# Patient Record
Sex: FEMALE | Race: WHITE | NOT HISPANIC OR LATINO | Employment: FULL TIME | URBAN - NONMETROPOLITAN AREA
[De-identification: names, ages, dates, MRNs, and addresses within clinical notes are randomized per-mention and may not be internally consistent; named-entity substitution may affect disease eponyms.]

---

## 2017-01-05 ENCOUNTER — DOCUMENTATION (OUTPATIENT)
Dept: CARDIOLOGY | Facility: CLINIC | Age: 57
End: 2017-01-05

## 2017-01-05 NOTE — PROGRESS NOTES
PA for Edarbi was faxed on 1/5/17. Received word today that this medication is covered on patients plan.

## 2017-02-06 ENCOUNTER — TELEPHONE (OUTPATIENT)
Dept: CARDIOLOGY | Facility: CLINIC | Age: 57
End: 2017-02-06

## 2017-02-06 NOTE — TELEPHONE ENCOUNTER
Received call from patient reports is unable to tolerate crestor, states has been in bed for 4 days muscle pain states wasn't able to tolerate zocor in the past.  What are your recommendations?

## 2017-02-21 ENCOUNTER — TELEPHONE (OUTPATIENT)
Dept: CARDIOLOGY | Facility: CLINIC | Age: 57
End: 2017-02-21

## 2017-02-21 RX ORDER — NIFEDIPINE 30 MG/1
TABLET, EXTENDED RELEASE ORAL
Qty: 90 TABLET | Refills: 0 | OUTPATIENT
Start: 2017-02-21

## 2017-02-21 RX ORDER — NIFEDIPINE 30 MG/1
30 TABLET, EXTENDED RELEASE ORAL DAILY
Qty: 90 TABLET | Refills: 1 | Status: SHIPPED | OUTPATIENT
Start: 2017-02-21 | End: 2017-03-27 | Stop reason: ALTCHOICE

## 2017-02-21 NOTE — TELEPHONE ENCOUNTER
Patient called requesting refill on Procardia 30mg daily, she reports she was unable to take Procardia 60mg daily due to swelling in legs, she reports that she notified you at [place]. Is it ok to refill Procardia 30mg daily? Thanks

## 2017-03-24 ENCOUNTER — TELEPHONE (OUTPATIENT)
Dept: CARDIOLOGY | Facility: CLINIC | Age: 57
End: 2017-03-24

## 2017-03-24 NOTE — TELEPHONE ENCOUNTER
Patient called reports after taking  procardia notices that lower legs/lower calf are swelling and turning red, wants to know if there is something else that can be used besides procardia? Thank you.           Medications:  Cozaar 100mg qam  bystolic 2.5mg qam  Procardia 30mg qam  Synthroid 125mcg qam  celebrex 200mg daily  Methotrexate 2.5mg 6 tablet once week

## 2017-03-27 NOTE — TELEPHONE ENCOUNTER
Patient notified to D/C Procardia and Cozaar and start Edarbi 40mg daily, patient already has script at [facility] pharmacy.

## 2017-04-13 ENCOUNTER — TELEPHONE (OUTPATIENT)
Dept: CARDIOLOGY | Facility: CLINIC | Age: 57
End: 2017-04-13

## 2017-04-13 RX ORDER — AMLODIPINE BESYLATE 5 MG/1
5 TABLET ORAL DAILY
Qty: 90 TABLET | Refills: 3 | Status: SHIPPED | OUTPATIENT
Start: 2017-04-13 | End: 2017-12-07 | Stop reason: SDUPTHER

## 2017-04-13 NOTE — TELEPHONE ENCOUNTER
Received call from patient who reports was changed from procardia and cozaar (due to legs swelling and pain) to edarbi 40mg daily reports swelling in legs better and doing well on edarbi but b/p not staying down in evening b/p yesterday evening 164/108, day before that 163/91, wants to know if can increase edarbi or if something else can be added in evening? What are your recommendations? Thank you    Medications:  bystolic 2.5mg qam  edarbi 40mg qam  Synthroid 125mcg qam  celebrex 200mg daily  Methotrexate 2.5mg 6 tablets once a week

## 2017-07-31 ENCOUNTER — OFFICE VISIT (OUTPATIENT)
Dept: CARDIOLOGY | Facility: CLINIC | Age: 57
End: 2017-07-31

## 2017-07-31 ENCOUNTER — TELEPHONE (OUTPATIENT)
Dept: CARDIOLOGY | Facility: CLINIC | Age: 57
End: 2017-07-31

## 2017-07-31 VITALS
DIASTOLIC BLOOD PRESSURE: 72 MMHG | HEIGHT: 68 IN | BODY MASS INDEX: 31.37 KG/M2 | WEIGHT: 207 LBS | SYSTOLIC BLOOD PRESSURE: 100 MMHG | HEART RATE: 68 BPM

## 2017-07-31 DIAGNOSIS — R06.02 SHORTNESS OF BREATH: ICD-10-CM

## 2017-07-31 DIAGNOSIS — R00.2 PALPITATIONS: ICD-10-CM

## 2017-07-31 DIAGNOSIS — E03.9 ACQUIRED HYPOTHYROIDISM: ICD-10-CM

## 2017-07-31 DIAGNOSIS — I10 ESSENTIAL HYPERTENSION: Primary | ICD-10-CM

## 2017-07-31 DIAGNOSIS — D86.0 SARCOIDOSIS OF LUNG (HCC): ICD-10-CM

## 2017-07-31 DIAGNOSIS — E78.00 HYPERCHOLESTEREMIA: ICD-10-CM

## 2017-07-31 PROCEDURE — 99214 OFFICE O/P EST MOD 30 MIN: CPT | Performed by: INTERNAL MEDICINE

## 2017-07-31 RX ORDER — CELECOXIB 200 MG/1
200 CAPSULE ORAL DAILY
COMMUNITY

## 2017-07-31 RX ORDER — GABAPENTIN 100 MG/1
100 CAPSULE ORAL 3 TIMES DAILY PRN
COMMUNITY
End: 2019-04-08

## 2017-07-31 RX ORDER — LOSARTAN POTASSIUM 100 MG/1
100 TABLET ORAL DAILY
COMMUNITY
End: 2018-02-01 | Stop reason: SDUPTHER

## 2017-07-31 NOTE — TELEPHONE ENCOUNTER
If you have not already planned on this, could you please put in an order for a Lipid panel. Her last labs were done in August of 2016. This is for the Repatha PA. Thank you    Also in filling out form for Brittany I noticed she has an allergy to Latex. Brittany has a latex tip. Do you want to change to Praluent ?

## 2017-07-31 NOTE — PROGRESS NOTES
"Chief Complaint   Patient presents with   • Follow-up     Denies chest pain. Stopped taking the edarbi and is taking the losartan. Also, is asking about either trying repatha or praluent. Has tried crestor and simvastatin and was unable to tolerate either. Didn't bring med list but went over verbally. Brought copy of recent labs. \"I have completely quit salt\".    • Shortness of Breath     With exertion. The same as before.    • Palpitations     Occasional. The same as before.        CARDIAC COMPLAINTS  dyspnea and palpitations        Subjective   Yessenia Baxter is a 57 y.o. female came in today for her follow-up visit.  She has history of hypertension, hypothyroidism, hypercholesterolemia who also has connective tissue problem in the form of lupus, rheumatoid arthritis and sarcoidosis.  She underwent an echocardiogram and stress test support 7 months ago.  She had significant hypertensive blood pressure response, normal LV systolic function, no ischemia, moderate AI take insufficiency and mild-to-moderate pulmonary hypertension.  Changes were made with the medications, but she changed it back secondary to the side effects.  She still not able to tolerate any statins.  Her cholesterol is still elevated with the LDL of 150.  Her major symptom now weighs shortness of breath.              Cardiac History  Past Surgical History:   Procedure Laterality Date   • CARDIOVASCULAR STRESS TEST  09/16/2015    R. [facility]- [facility] 6 Min, 90% THR. BP- 204/100. Few PVC's. Negative    • CARDIOVASCULAR STRESS TEST  11/28/2016    @[facility]. 6 Min, 89%THR. BP- 210/106. Negative   • ECHO - CONVERTED  09/16/2015     Echo- ([facility]) EF 60%. Mod AI. RVSP- 50 mmHg    • ECHO - CONVERTED  11/28/2016    @[facility]. EF 65%. Mild- Mod AI. RVSP-51 mmHg       Current Outpatient Prescriptions   Medication Sig Dispense Refill   • acetaminophen (TYLENOL) 500 MG tablet Take 500 mg by mouth As Needed for mild pain (1-3).     • amLODIPine (NORVASC) 5 MG tablet Take 1 tablet " by mouth Daily. 90 tablet 3   • celecoxib (CeleBREX) 200 MG capsule Take 200 mg by mouth Daily.     • fexofenadine (ALLEGRA) 180 MG tablet Take 180 mg by mouth As Needed.     • folic acid (FOLVITE) 1 MG tablet Take 1 mg by mouth Daily.     • gabapentin (NEURONTIN) 100 MG capsule Take 100 mg by mouth 3 (Three) Times a Day As Needed.     • levothyroxine (SYNTHROID, LEVOTHROID) 125 MCG tablet Take 125 mcg by mouth Daily.     • losartan (COZAAR) 100 MG tablet Take 100 mg by mouth Daily.     • methotrexate 2.5 MG tablet Take  by mouth. Take 6 tabs weekly     • Multiple Vitamin (MULTI VITAMIN PO) Take  by mouth Daily.     • nebivolol (BYSTOLIC) 2.5 MG tablet Take 1 tablet by mouth Daily. 90 tablet 3   • vitamin D (ERGOCALCIFEROL) 94566 UNITS capsule capsule Take 50,000 Units by mouth 1 (One) Time Per Week.     • Evolocumab with Infusor 420 MG/3.5ML solution cartridge Inject 420 mg under the skin Every 30 (Thirty) Days. 1 cartridge. 8     No current facility-administered medications for this visit.        Allergies  :  Crestor [rosuvastatin calcium]; Demerol [meperidine]; Latex; Other; Penicillins; Simvastatin; and Tetracyclines & related       Past Medical History:   Diagnosis Date   • Arthritis    • Cyst removed from right hand    • Esophageal reflux    • Fibromyalgia    • H/O shoulder surgery     right   • H/O: hysterectomy    • History of cholecystectomy    • Hypercholesterolemia    • Hypertension    • Hypothyroidism    • Left knee surgery twice    • Sarcoidosis        Social History     Social History   • Marital status:      Spouse name: N/A   • Number of children: N/A   • Years of education: N/A     Occupational History   • Not on file.     Social History Main Topics   • Smoking status: Never Smoker   • Smokeless tobacco: Never Used   • Alcohol use No   • Drug use: No   • Sexual activity: Not on file     Other Topics Concern   • Not on file     Social History Narrative       Family History   Problem Relation  "Age of Onset   • Stroke Mother 76   • Other Mother      History of A Fib.   • Heart attack Father    • Heart failure Father    • Other Other       one at age 43 had AVR. one had stents at 67,        Review of Systems   Constitution: Negative for decreased appetite.   HENT: Negative for congestion and sore throat.    Eyes: Negative for blurred vision.   Cardiovascular: Positive for dyspnea on exertion. Negative for chest pain.   Respiratory: Positive for shortness of breath. Negative for snoring.    Endocrine: Negative for cold intolerance and heat intolerance.   Hematologic/Lymphatic: Negative for adenopathy. Does not bruise/bleed easily.   Skin: Negative for itching, nail changes and skin cancer.   Musculoskeletal: Positive for arthritis and myalgias.   Gastrointestinal: Negative for abdominal pain, dysphagia and heartburn.   Genitourinary: Negative for bladder incontinence and frequency.   Neurological: Negative for dizziness, light-headedness, seizures and vertigo.   Psychiatric/Behavioral: Negative for altered mental status.   Allergic/Immunologic: Negative for environmental allergies and hives.       Diabetes- No  Thyroid- abnormal    Objective     /72  Pulse 68  Ht 68\" (172.7 cm)  Wt 207 lb (93.9 kg)  BMI 31.47 kg/m2    Physical Exam   Constitutional: She is oriented to person, place, and time. She appears well-nourished.   HENT:   Head: Normocephalic.   Eyes: Pupils are equal, round, and reactive to light.   Neck: Normal range of motion.   Cardiovascular: Normal rate, regular rhythm, S1 normal and S2 normal.    Murmur heard.  Pulmonary/Chest: Breath sounds normal.   Abdominal: Soft. Bowel sounds are normal.   Musculoskeletal: Normal range of motion.   Neurological: She is alert and oriented to person, place, and time.   Skin: Skin is warm.   Psychiatric: She has a normal mood and affect.     Procedures            Assessment/Plan     Yessenia was seen today for follow-up, shortness of breath and " palpitations.    Diagnoses and all orders for this visit:    Essential hypertension    Hypercholesteremia  -     Evolocumab with Infusor 420 MG/3.5ML solution cartridge; Inject 420 mg under the skin Every 30 (Thirty) Days.    Shortness of breath  -     Ambulatory Referral to Pulmonology    Palpitations    Acquired hypothyroidism    Sarcoidosis of lung  -     Ambulatory Referral to Pulmonology       Her heart rate and blood pressure at baseline appears stable.  I had a long talk with her about the echo and the stress findings.  She had numerous questions regarding the pulmonary hypertension.  I explained to her about her connective tissue problem and the possible sarcoidosis.  She needs an x-ray chest as well as PFT's.  She also need to see a pulmonologist.  She wanted to see someone locally and consults were made.  Regarding her cholesterol, since she is not able to tolerate any statins, I started her on Repatha.  I explained to her the possible side effects.  I'll see her back in 6 months or sooner if needed.                   Electronically signed by Ja Bojorquez MD July 31, 2017 4:35 PM

## 2017-10-30 ENCOUNTER — DOCUMENTATION (OUTPATIENT)
Dept: CARDIOLOGY | Facility: CLINIC | Age: 57
End: 2017-10-30

## 2017-10-30 ENCOUNTER — TELEPHONE (OUTPATIENT)
Dept: CARDIOLOGY | Facility: CLINIC | Age: 57
End: 2017-10-30

## 2017-10-30 NOTE — TELEPHONE ENCOUNTER
In order to get Praluent approved for this patient , she is needing a diagnosis of Familial Hypercholesterolemia.

## 2017-10-31 ENCOUNTER — TELEPHONE (OUTPATIENT)
Dept: CARDIOLOGY | Facility: CLINIC | Age: 57
End: 2017-10-31

## 2017-10-31 NOTE — TELEPHONE ENCOUNTER
Spoke to patient.  Both parents and 3 siblings have hypercholesterolemia. Form completed and faxed on 10/31/17

## 2017-10-31 NOTE — TELEPHONE ENCOUNTER
I am waiting for an answer from patients insurance regarding Paluent.     Is it ok to give her samples to get her started? We have plenty samples on hand.

## 2017-11-02 ENCOUNTER — TELEPHONE (OUTPATIENT)
Dept: CARDIOLOGY | Facility: CLINIC | Age: 57
End: 2017-11-02

## 2017-11-02 DIAGNOSIS — I35.1 AORTIC VALVE INSUFFICIENCY, ETIOLOGY OF CARDIAC VALVE DISEASE UNSPECIFIED: Primary | ICD-10-CM

## 2017-11-02 NOTE — TELEPHONE ENCOUNTER
LM message for patient informing her that Echo has been ordered and that Lizandro will be calling with scheduled date and time. Advised to call back with any questions

## 2017-11-02 NOTE — TELEPHONE ENCOUNTER
Patient was in office to  samples. She wanted me to ask you if she could get an echo done before the end of the year due to insurance? She stated that she gets one every year.     If ok,  could you please put in an order? Thank you

## 2017-11-09 ENCOUNTER — TELEPHONE (OUTPATIENT)
Dept: CARDIOLOGY | Facility: CLINIC | Age: 57
End: 2017-11-09

## 2017-11-09 DIAGNOSIS — E78.01 FAMILIAL HYPERCHOLESTEREMIA: Primary | ICD-10-CM

## 2017-11-09 NOTE — TELEPHONE ENCOUNTER
Could you please place an order for a Fasting Lipid Panel?  The last one on record is 2016. This is for Praluent Appeal Letter.    Thank you

## 2017-11-13 ENCOUNTER — DOCUMENTATION (OUTPATIENT)
Dept: CARDIOLOGY | Facility: CLINIC | Age: 57
End: 2017-11-13

## 2017-11-21 ENCOUNTER — TELEPHONE (OUTPATIENT)
Dept: CARDIOLOGY | Facility: CLINIC | Age: 57
End: 2017-11-21

## 2017-11-21 NOTE — TELEPHONE ENCOUNTER
Insurance is questioning why patient is being started on the higher dose of Praluent 150 and not 75mg. Requiring Clinical Rationale for the higher dose.

## 2017-12-01 ENCOUNTER — TELEPHONE (OUTPATIENT)
Dept: CARDIOLOGY | Facility: CLINIC | Age: 57
End: 2017-12-01

## 2017-12-01 NOTE — TELEPHONE ENCOUNTER
Patient aware of echo results.  She recently started CPAP and has already noticed improvement in how she is feeling.

## 2017-12-07 ENCOUNTER — TELEPHONE (OUTPATIENT)
Dept: CARDIOLOGY | Facility: CLINIC | Age: 57
End: 2017-12-07

## 2017-12-07 RX ORDER — AMLODIPINE BESYLATE 5 MG/1
5 TABLET ORAL DAILY
Qty: 90 TABLET | Refills: 1 | Status: SHIPPED | OUTPATIENT
Start: 2017-12-07 | End: 2018-02-05 | Stop reason: SDUPTHER

## 2017-12-07 RX ORDER — NEBIVOLOL 2.5 MG/1
2.5 TABLET ORAL DAILY
Qty: 90 TABLET | Refills: 1 | Status: SHIPPED | OUTPATIENT
Start: 2017-12-07 | End: 2018-02-05 | Stop reason: SDUPTHER

## 2017-12-07 NOTE — TELEPHONE ENCOUNTER
Patient called requesting refill on Bystolic 2.5mg daily and Amlodipine 5mg daily for 90 days. Refills on Bystolic 2.5mg daily and Amlodipine 5mg daily sent to pharmacy. Patient gave verbal permission for her sister Daphnie Costa to  Praluent tomorrow, due to patient is out of town.

## 2018-01-03 ENCOUNTER — TELEPHONE (OUTPATIENT)
Dept: CARDIOLOGY | Facility: CLINIC | Age: 58
End: 2018-01-03

## 2018-01-03 NOTE — TELEPHONE ENCOUNTER
Patient is needing samples of Praluent 150 mg. However, we only have the 75 mg pens. She is asking if ok to take the 75 mg twice a month. Her last labs done in November showed .

## 2018-02-01 RX ORDER — LOSARTAN POTASSIUM 100 MG/1
TABLET ORAL
Qty: 90 TABLET | Refills: 1 | Status: SHIPPED | OUTPATIENT
Start: 2018-02-01 | End: 2018-02-05 | Stop reason: SDUPTHER

## 2018-02-05 ENCOUNTER — OFFICE VISIT (OUTPATIENT)
Dept: CARDIOLOGY | Facility: CLINIC | Age: 58
End: 2018-02-05

## 2018-02-05 VITALS
BODY MASS INDEX: 32.58 KG/M2 | SYSTOLIC BLOOD PRESSURE: 136 MMHG | DIASTOLIC BLOOD PRESSURE: 86 MMHG | HEART RATE: 72 BPM | WEIGHT: 215 LBS | HEIGHT: 68 IN

## 2018-02-05 DIAGNOSIS — E78.00 HYPERCHOLESTEREMIA: ICD-10-CM

## 2018-02-05 DIAGNOSIS — G47.30 SLEEP APNEA IN ADULT: ICD-10-CM

## 2018-02-05 DIAGNOSIS — R06.02 SHORTNESS OF BREATH: ICD-10-CM

## 2018-02-05 DIAGNOSIS — R00.2 PALPITATIONS: ICD-10-CM

## 2018-02-05 DIAGNOSIS — I10 ESSENTIAL HYPERTENSION: Primary | ICD-10-CM

## 2018-02-05 DIAGNOSIS — E03.9 ACQUIRED HYPOTHYROIDISM: ICD-10-CM

## 2018-02-05 DIAGNOSIS — D86.0 SARCOIDOSIS OF LUNG (HCC): ICD-10-CM

## 2018-02-05 PROCEDURE — 99213 OFFICE O/P EST LOW 20 MIN: CPT | Performed by: INTERNAL MEDICINE

## 2018-02-05 RX ORDER — LOSARTAN POTASSIUM 100 MG/1
100 TABLET ORAL DAILY
Qty: 90 TABLET | Refills: 3 | Status: SHIPPED | OUTPATIENT
Start: 2018-02-05 | End: 2018-04-11 | Stop reason: SDUPTHER

## 2018-02-05 RX ORDER — AMLODIPINE BESYLATE 5 MG/1
5 TABLET ORAL DAILY
Qty: 90 TABLET | Refills: 3 | Status: SHIPPED | OUTPATIENT
Start: 2018-02-05 | End: 2018-04-11 | Stop reason: SDUPTHER

## 2018-02-05 RX ORDER — NEBIVOLOL 2.5 MG/1
2.5 TABLET ORAL DAILY
Qty: 90 TABLET | Refills: 1 | Status: SHIPPED | OUTPATIENT
Start: 2018-02-05 | End: 2018-08-06 | Stop reason: SDUPTHER

## 2018-02-05 RX ORDER — CETIRIZINE HYDROCHLORIDE 10 MG/1
10 TABLET ORAL DAILY
COMMUNITY
End: 2019-04-08

## 2018-02-05 NOTE — PROGRESS NOTES
Chief Complaint   Patient presents with   • Follow-up     for cardiac management   • Med Refill     refills needed on cardiac meds.  90 days to [facility] in [place].    • Palpitations     3-4 times a week, only lasts a few seconds.    • Labs     Most recent from November are in chart   • Shortness of Breath     still has occasionally but seems better since using CPAP at night for the past 2 months.        CARDIAC COMPLAINTS  dyspnea and palpitations        Subjective   Yessenia Baxter is a 58 y.o. female came in today for her follow-up visit.  She has history of hypertension, hypercholesterolemia, metabolic syndrome, sleep apnea who also has moderate aortic insufficiency.  Regarding her lipids, she is not able to tolerate any statins and is now using Praluent.  Her recent echocardiogram showed the left ventricle of function is normal mild to moderate AI and the PA pressure is 54 mmHg.  She is now using CPAP machine.  She came today with occasional palpitation which occurs randomly without any precipitating factors.  She does get shortness of breath if she climbs one or 2 flights of steps.  Her BMI as gone up to 33              Cardiac History  Past Surgical History:   Procedure Laterality Date   • CARDIOVASCULAR STRESS TEST  09/16/2015    R. Stress- ([facility]) 6 Min, 90% THR. BP- 204/100. Few PVC's. Negative    • CARDIOVASCULAR STRESS TEST  11/28/2016    @[facility]. 6 Min, 89%THR. BP- 210/106. Negative   • ECHO - CONVERTED  09/16/2015     Echo- ([facility]) EF 60%. Mod AI. RVSP- 50 mmHg    • ECHO - CONVERTED  11/28/2016    @[facility]. EF 65%. Mild- Mod AI. RVSP-51 mmHg   • ECHO - CONVERTED  11/21/2017    @[facility]. EF 65%. Mild- Mod AI. Mild MR. RVSP- 54 mmHg       Current Outpatient Prescriptions   Medication Sig Dispense Refill   • Acetaminophen (TYLENOL ARTHRITIS PAIN PO) Take  by mouth.     • acetaminophen (TYLENOL) 500 MG tablet Take 500 mg by mouth As Needed for mild pain (1-3).     • Alirocumab (PRALUENT) 75 MG/ML solution pen-injector  Inject 1 mL under the skin Every 14 (Fourteen) Days. 2 pen 0   • amLODIPine (NORVASC) 5 MG tablet Take 1 tablet by mouth Daily. 90 tablet 3   • BLACK COHOSH PO Take  by mouth.     • celecoxib (CeleBREX) 200 MG capsule Take 200 mg by mouth Daily.     • cetirizine (zyrTEC) 10 MG tablet Take 10 mg by mouth Daily.     • Cyanocobalamin (VITAMIN B-12 IJ) Inject  as directed Every 14 (Fourteen) Days.     • folic acid (FOLVITE) 1 MG tablet Take 1 mg by mouth Daily.     • gabapentin (NEURONTIN) 100 MG capsule Take 100 mg by mouth 3 (Three) Times a Day As Needed.     • levothyroxine (SYNTHROID, LEVOTHROID) 125 MCG tablet Take 125 mcg by mouth Daily.     • losartan (COZAAR) 100 MG tablet Take 1 tablet by mouth Daily. 90 tablet 3   • Multiple Vitamin (MULTI VITAMIN PO) Take  by mouth Daily.     • nebivolol (BYSTOLIC) 2.5 MG tablet Take 1 tablet by mouth Daily. 90 tablet 1   • Probiotic Product (SOLUBLE FIBER/PROBIOTICS PO) Take  by mouth.     • VITAMIN A PO Take  by mouth.     • vitamin D (ERGOCALCIFEROL) 78987 UNITS capsule capsule Take 50,000 Units by mouth 1 (One) Time Per Week.       No current facility-administered medications for this visit.        Allergies  :  Crestor [rosuvastatin calcium]; Demerol [meperidine]; Latex; Other; Penicillins; Simvastatin; and Tetracyclines & related       Past Medical History:   Diagnosis Date   • Arthritis    • Cyst removed from right hand    • Esophageal reflux    • Fibromyalgia    • H/O shoulder surgery     right   • H/O: hysterectomy    • History of cholecystectomy    • Hypercholesterolemia    • Hypertension    • Hypothyroidism    • Left knee surgery twice    • Sarcoidosis        Social History     Social History   • Marital status:      Spouse name: N/A   • Number of children: N/A   • Years of education: N/A     Occupational History   • Not on file.     Social History Main Topics   • Smoking status: Never Smoker   • Smokeless tobacco: Never Used   • Alcohol use No   • Drug use:  "No   • Sexual activity: Not on file     Other Topics Concern   • Not on file     Social History Narrative       Family History   Problem Relation Age of Onset   • Stroke Mother 76   • Other Mother      History of A Fib.   • Heart attack Father    • Heart failure Father    • Other Other       one at age 43 had AVR. one had stents at 67,        Review of Systems   Constitution: Positive for malaise/fatigue and weight gain. Negative for decreased appetite.   HENT: Negative for congestion and sore throat.    Eyes: Negative for blurred vision.   Cardiovascular: Positive for dyspnea on exertion and palpitations. Negative for chest pain.   Respiratory: Positive for shortness of breath and snoring.    Endocrine: Negative for cold intolerance and heat intolerance.   Hematologic/Lymphatic: Negative for adenopathy. Does not bruise/bleed easily.   Skin: Negative for itching, nail changes and skin cancer.   Musculoskeletal: Negative for arthritis and myalgias.   Gastrointestinal: Negative for abdominal pain, dysphagia and heartburn.   Genitourinary: Negative for bladder incontinence and frequency.   Neurological: Negative for dizziness, light-headedness, seizures and vertigo.   Psychiatric/Behavioral: Negative for altered mental status.   Allergic/Immunologic: Negative for environmental allergies and hives.       Diabetes- No  Thyroid- normal    Objective     /86  Pulse 72  Ht 172.7 cm (68\")  Wt 97.5 kg (215 lb)  BMI 32.69 kg/m2    Physical Exam   Constitutional: She is oriented to person, place, and time. She appears well-developed and well-nourished.   HENT:   Head: Normocephalic.   Nose: Nose normal.   Eyes: EOM are normal. Pupils are equal, round, and reactive to light.   Neck: Normal range of motion. Neck supple.   Cardiovascular: Normal rate, regular rhythm, S1 normal and S2 normal.    Murmur heard.  Pulmonary/Chest: Effort normal and breath sounds normal.   Abdominal: Soft. Bowel sounds are normal. "   Musculoskeletal: Normal range of motion. She exhibits no edema.   Neurological: She is alert and oriented to person, place, and time.   Skin: Skin is warm and dry.   Psychiatric: She has a normal mood and affect.     Procedures            Assessment/Plan     Yessenia was seen today for follow-up, med refill, palpitations, labs and shortness of breath.    Diagnoses and all orders for this visit:    Essential hypertension  -     nebivolol (BYSTOLIC) 2.5 MG tablet; Take 1 tablet by mouth Daily.  -     losartan (COZAAR) 100 MG tablet; Take 1 tablet by mouth Daily.  -     amLODIPine (NORVASC) 5 MG tablet; Take 1 tablet by mouth Daily.    Hypercholesteremia    Palpitations  -     nebivolol (BYSTOLIC) 2.5 MG tablet; Take 1 tablet by mouth Daily.    Shortness of breath    Sarcoidosis of lung    Acquired hypothyroidism    Sleep apnea in adult       Her heart rate and blood pressure is better.  I had a long talk with her about the BMI.  I talked to her about the low carb diet.  Most of her problem seems to be from that.  I explained to her about the echo findings.  Continue the same medications.  If the palpitation gets really worse, then we'll put an extended monitor.  Recheck her labs in about 4-5 months.  Reassurance about the cardiac status was given.  I'll see her back in 6 months or sooner if needed.                  Electronically signed by Ja Bojorquez MD February 5, 2018 3:47 PM

## 2018-04-11 ENCOUNTER — TELEPHONE (OUTPATIENT)
Dept: CARDIOLOGY | Facility: CLINIC | Age: 58
End: 2018-04-11

## 2018-04-11 DIAGNOSIS — I10 ESSENTIAL HYPERTENSION: ICD-10-CM

## 2018-04-11 RX ORDER — LOSARTAN POTASSIUM 100 MG/1
100 TABLET ORAL DAILY
Qty: 90 TABLET | Refills: 3 | Status: SHIPPED | OUTPATIENT
Start: 2018-04-11 | End: 2018-04-13 | Stop reason: SDUPTHER

## 2018-04-11 RX ORDER — AMLODIPINE BESYLATE 5 MG/1
5 TABLET ORAL DAILY
Qty: 90 TABLET | Refills: 3 | Status: SHIPPED | OUTPATIENT
Start: 2018-04-11 | End: 2018-08-06 | Stop reason: SDUPTHER

## 2018-04-13 DIAGNOSIS — I10 ESSENTIAL HYPERTENSION: ICD-10-CM

## 2018-04-13 RX ORDER — LOSARTAN POTASSIUM 100 MG/1
100 TABLET ORAL DAILY
Qty: 90 TABLET | Refills: 3 | Status: SHIPPED | OUTPATIENT
Start: 2018-04-13 | End: 2018-08-06 | Stop reason: SDUPTHER

## 2018-08-06 ENCOUNTER — OFFICE VISIT (OUTPATIENT)
Dept: CARDIOLOGY | Facility: CLINIC | Age: 58
End: 2018-08-06

## 2018-08-06 VITALS
BODY MASS INDEX: 31.67 KG/M2 | HEART RATE: 68 BPM | SYSTOLIC BLOOD PRESSURE: 136 MMHG | WEIGHT: 209 LBS | DIASTOLIC BLOOD PRESSURE: 90 MMHG | HEIGHT: 68 IN

## 2018-08-06 DIAGNOSIS — G47.30 SLEEP APNEA IN ADULT: ICD-10-CM

## 2018-08-06 DIAGNOSIS — R00.2 PALPITATIONS: ICD-10-CM

## 2018-08-06 DIAGNOSIS — R07.2 PRECORDIAL PAIN: ICD-10-CM

## 2018-08-06 DIAGNOSIS — R06.02 SHORTNESS OF BREATH: ICD-10-CM

## 2018-08-06 DIAGNOSIS — E03.9 ACQUIRED HYPOTHYROIDISM: ICD-10-CM

## 2018-08-06 DIAGNOSIS — I10 ESSENTIAL HYPERTENSION: Primary | ICD-10-CM

## 2018-08-06 DIAGNOSIS — E78.00 HYPERCHOLESTEREMIA: ICD-10-CM

## 2018-08-06 DIAGNOSIS — D86.0 SARCOIDOSIS OF LUNG (HCC): ICD-10-CM

## 2018-08-06 PROCEDURE — 99213 OFFICE O/P EST LOW 20 MIN: CPT | Performed by: INTERNAL MEDICINE

## 2018-08-06 RX ORDER — AMLODIPINE BESYLATE 5 MG/1
5 TABLET ORAL DAILY
Qty: 90 TABLET | Refills: 3 | Status: SHIPPED | OUTPATIENT
Start: 2018-08-06 | End: 2019-04-08 | Stop reason: SDUPTHER

## 2018-08-06 RX ORDER — NEBIVOLOL 2.5 MG/1
2.5 TABLET ORAL DAILY
Qty: 30 TABLET | Refills: 8 | Status: SHIPPED | OUTPATIENT
Start: 2018-08-06 | End: 2019-02-08 | Stop reason: DRUGHIGH

## 2018-08-06 RX ORDER — LOSARTAN POTASSIUM 100 MG/1
100 TABLET ORAL DAILY
Qty: 90 TABLET | Refills: 3 | Status: SHIPPED | OUTPATIENT
Start: 2018-08-06 | End: 2019-04-08 | Stop reason: SDUPTHER

## 2018-08-06 NOTE — PROGRESS NOTES
Chief Complaint   Patient presents with   • Follow-up     For cardiac management. Patient is not on aspirin. Was put on Xeljanz. Reports that she had one episode of chest pains and took 2 baby aspirin and it went away. Reports that she has cracked ribs due to fall so she has not used CPAP. Reports shortness of breath is no more than usual. Reports that lab work will be done this month at the [facility].    • Med Refill     Needs refills on cozaar and amlodipine to [facility], 90 day supplys. Bystolic to [facility] in [place], 30 day supplys.        CARDIAC COMPLAINTS  chest pressure/discomfort, dyspnea and fatigue        Subjective   Yessenia Baxter is a 58 y.o. female came in today for her follow-up visit.  She has history of hypertension, hypercholesterolemia, valvular heart disease who also has sarcoidosis.  She is not able to tolerate any statins and so she takes Praluent.  She is due to have her labs done in the next few weeks.  Apparently she fell and she injured her ribs.  She is not able to use the CPAP machine for a while now.  She had an episode of chest pain few months ago, which resolved with aspirin.  She is not taking any regular aspirin because of one episode of epistaxis she had in the past.              Cardiac History  Past Surgical History:   Procedure Laterality Date   • CARDIOVASCULAR STRESS TEST  09/16/2015    R. Stress- ([facility]) 6 Min, 90% THR. BP- 204/100. Few PVC's. Negative    • CARDIOVASCULAR STRESS TEST  11/28/2016    @[facility]. 6 Min, 89%THR. BP- 210/106. Negative   • ECHO - CONVERTED  09/16/2015     Echo- ([facility]) EF 60%. Mod AI. RVSP- 50 mmHg    • ECHO - CONVERTED  11/28/2016    @[facility]. EF 65%. Mild- Mod AI. RVSP-51 mmHg   • ECHO - CONVERTED  11/21/2017    @[facility]. EF 65%. Mild- Mod AI. Mild MR. RVSP- 54 mmHg       Current Outpatient Prescriptions   Medication Sig Dispense Refill   • Acetaminophen (TYLENOL ARTHRITIS PAIN PO) Take  by mouth.     • acetaminophen (TYLENOL) 500 MG tablet Take 500 mg by  mouth As Needed for mild pain (1-3).     • amLODIPine (NORVASC) 5 MG tablet Take 1 tablet by mouth Daily. 90 tablet 3   • BLACK COHOSH PO Take  by mouth.     • celecoxib (CeleBREX) 200 MG capsule Take 200 mg by mouth Daily.     • cetirizine (zyrTEC) 10 MG tablet Take 10 mg by mouth Daily.     • Cyanocobalamin (VITAMIN B-12 IJ) Inject  as directed Every 14 (Fourteen) Days.     • folic acid (FOLVITE) 1 MG tablet Take 1 mg by mouth Daily.     • gabapentin (NEURONTIN) 100 MG capsule Take 100 mg by mouth 3 (Three) Times a Day As Needed.     • levothyroxine (SYNTHROID, LEVOTHROID) 125 MCG tablet Take 125 mcg by mouth Daily.     • losartan (COZAAR) 100 MG tablet Take 1 tablet by mouth Daily. 90 tablet 3   • Multiple Vitamin (MULTI VITAMIN PO) Take  by mouth Daily.     • nebivolol (BYSTOLIC) 2.5 MG tablet Take 1 tablet by mouth Daily. 30 tablet 8   • PRALUENT 150 MG/ML solution pen-injector INJECT 1 ML UNDER THE SKIN EVERY TWO WEEKS 2 mL 4   • Probiotic Product (SOLUBLE FIBER/PROBIOTICS PO) Take  by mouth.     • Tofacitinib Citrate (XELJANZ XR) 11 MG tablet sustained-release 24 hour Take 11 mg by mouth Daily.     • VITAMIN A PO Take  by mouth.     • vitamin D (ERGOCALCIFEROL) 59315 UNITS capsule capsule Take 50,000 Units by mouth 1 (One) Time Per Week.       No current facility-administered medications for this visit.        Allergies  :  Crestor [rosuvastatin calcium]; Demerol [meperidine]; Latex; Other; Penicillins; Simvastatin; and Tetracyclines & related       Past Medical History:   Diagnosis Date   • Arthritis    • Cyst removed from right hand    • Esophageal reflux    • Fibromyalgia    • H/O shoulder surgery     right   • H/O: hysterectomy    • History of cholecystectomy    • Hypercholesterolemia    • Hypertension    • Hypothyroidism    • Left knee surgery twice    • Sarcoidosis        Social History     Social History   • Marital status:      Spouse name: N/A   • Number of children: N/A   • Years of  "education: N/A     Occupational History   • Not on file.     Social History Main Topics   • Smoking status: Never Smoker   • Smokeless tobacco: Never Used   • Alcohol use No   • Drug use: No   • Sexual activity: Not on file     Other Topics Concern   • Not on file     Social History Narrative   • No narrative on file       Family History   Problem Relation Age of Onset   • Stroke Mother 76   • Other Mother         History of A Fib.   • Heart attack Father    • Heart failure Father    • Other Other          one at age 43 had AVR. one had stents at 67,        Review of Systems   Constitution: Positive for malaise/fatigue. Negative for decreased appetite.   HENT: Negative for congestion and sore throat.    Eyes: Negative for blurred vision.   Cardiovascular: Positive for chest pain and dyspnea on exertion.   Respiratory: Positive for shortness of breath. Negative for snoring.    Endocrine: Negative for cold intolerance and heat intolerance.   Hematologic/Lymphatic: Negative for adenopathy. Does not bruise/bleed easily.   Skin: Negative for itching, nail changes and skin cancer.   Musculoskeletal: Negative for arthritis and myalgias.   Gastrointestinal: Negative for abdominal pain, dysphagia and heartburn.   Genitourinary: Negative for bladder incontinence and frequency.   Neurological: Negative for dizziness, light-headedness, seizures and vertigo.   Psychiatric/Behavioral: Negative for altered mental status.   Allergic/Immunologic: Negative for environmental allergies and hives.       Diabetes- No  Thyroid- abnormal    Objective     /90 (BP Location: Right arm)   Pulse 68   Ht 172.7 cm (67.99\")   Wt 94.8 kg (209 lb)   BMI 31.79 kg/m²     Physical Exam   Constitutional: She is oriented to person, place, and time. She appears well-developed and well-nourished.   HENT:   Head: Normocephalic.   Nose: Nose normal.   Eyes: Pupils are equal, round, and reactive to light. EOM are normal.   Neck: Normal range of " motion. Neck supple.   Cardiovascular: Normal rate, regular rhythm, S1 normal and S2 normal.    Murmur heard.  Pulmonary/Chest: Effort normal and breath sounds normal.   Abdominal: Soft. Bowel sounds are normal.   Musculoskeletal: Normal range of motion. She exhibits no edema.   Neurological: She is alert and oriented to person, place, and time.   Skin: Skin is warm and dry.   Psychiatric: She has a normal mood and affect.     Procedures            Assessment/Plan   Patient's Body mass index is 31.79 kg/m². BMI is above normal parameters. Recommendations include: exercise counseling and nutrition counseling.     Yessenia was seen today for follow-up and med refill.    Diagnoses and all orders for this visit:    Essential hypertension  -     losartan (COZAAR) 100 MG tablet; Take 1 tablet by mouth Daily.  -     amLODIPine (NORVASC) 5 MG tablet; Take 1 tablet by mouth Daily.  -     nebivolol (BYSTOLIC) 2.5 MG tablet; Take 1 tablet by mouth Daily.    Hypercholesteremia    Shortness of breath    Sarcoidosis of lung (CMS/HCC)    Acquired hypothyroidism    Sleep apnea in adult    Precordial pain    Palpitations  -     nebivolol (BYSTOLIC) 2.5 MG tablet; Take 1 tablet by mouth Daily.     Her heart rate and blood pressure appears to be stable.  The diastolic is slightly elevated.  She has not taken her amlodipine today.  She is advised to take it more regularly.  Her BMI is still around 32.  She did lose 6 pounds in the last 6 months.  She is encouraged to continue her diet and weight reduction.  She is advised to start taking one baby aspirin a day.  Prescription for the medications were given and samples of Bystolic was given.  She was advised to undergo investigation again but she wanted to hold off because of the increase copayment.  She is advised to call our office if the chest pain continues.  I will see her back in 6 months or sooner if needed.                Electronically signed by Ja Bojorquez MD August 6,  2018 3:32 PM

## 2018-08-07 ENCOUNTER — TELEPHONE (OUTPATIENT)
Dept: CARDIOLOGY | Facility: CLINIC | Age: 58
End: 2018-08-07

## 2018-08-07 RX ORDER — NEBIVOLOL 5 MG/1
2.5 TABLET ORAL DAILY
Qty: 28 TABLET | Refills: 0 | COMMUNITY
Start: 2018-08-07 | End: 2018-09-25 | Stop reason: SDUPTHER

## 2018-08-07 NOTE — TELEPHONE ENCOUNTER
Patient was given samples of Bystolic 5 mg 1/2 tablet QD while here yesterday for appointment. 28 tablets were given.

## 2018-09-25 ENCOUNTER — TELEPHONE (OUTPATIENT)
Dept: CARDIOLOGY | Facility: CLINIC | Age: 58
End: 2018-09-25

## 2018-09-25 RX ORDER — NEBIVOLOL 5 MG/1
2.5 TABLET ORAL DAILY
Qty: 28 TABLET | Refills: 0 | COMMUNITY
Start: 2018-09-25 | End: 2018-11-15 | Stop reason: SDUPTHER

## 2018-11-15 ENCOUNTER — TELEPHONE (OUTPATIENT)
Dept: CARDIOLOGY | Facility: CLINIC | Age: 58
End: 2018-11-15

## 2018-11-15 RX ORDER — NEBIVOLOL 5 MG/1
2.5 TABLET ORAL DAILY
Qty: 14 TABLET | Refills: 0 | COMMUNITY
Start: 2018-11-15 | End: 2018-12-17 | Stop reason: SDUPTHER

## 2018-12-17 ENCOUNTER — TELEPHONE (OUTPATIENT)
Dept: CARDIOLOGY | Facility: CLINIC | Age: 58
End: 2018-12-17

## 2018-12-17 RX ORDER — NEBIVOLOL 5 MG/1
2.5 TABLET ORAL DAILY
Qty: 14 TABLET | Refills: 0 | COMMUNITY
Start: 2018-12-17 | End: 2019-02-08 | Stop reason: DRUGHIGH

## 2019-02-08 ENCOUNTER — TELEPHONE (OUTPATIENT)
Dept: CARDIOLOGY | Facility: CLINIC | Age: 59
End: 2019-02-08

## 2019-02-08 RX ORDER — NEBIVOLOL 5 MG/1
2.5 TABLET ORAL DAILY
Qty: 14 TABLET | Refills: 0 | COMMUNITY
Start: 2019-02-08 | End: 2019-04-08 | Stop reason: SDUPTHER

## 2019-04-08 ENCOUNTER — OFFICE VISIT (OUTPATIENT)
Dept: CARDIOLOGY | Facility: CLINIC | Age: 59
End: 2019-04-08

## 2019-04-08 VITALS
BODY MASS INDEX: 30.62 KG/M2 | WEIGHT: 202 LBS | HEIGHT: 68 IN | DIASTOLIC BLOOD PRESSURE: 62 MMHG | SYSTOLIC BLOOD PRESSURE: 124 MMHG | HEART RATE: 64 BPM

## 2019-04-08 DIAGNOSIS — I27.20 PHT (PULMONARY HYPERTENSION) (HCC): ICD-10-CM

## 2019-04-08 DIAGNOSIS — R06.02 SHORTNESS OF BREATH: ICD-10-CM

## 2019-04-08 DIAGNOSIS — G47.30 SLEEP APNEA IN ADULT: ICD-10-CM

## 2019-04-08 DIAGNOSIS — D86.0 SARCOIDOSIS OF LUNG (HCC): ICD-10-CM

## 2019-04-08 DIAGNOSIS — E78.00 HYPERCHOLESTEREMIA: ICD-10-CM

## 2019-04-08 DIAGNOSIS — E88.81 METABOLIC SYNDROME: ICD-10-CM

## 2019-04-08 DIAGNOSIS — I10 ESSENTIAL HYPERTENSION: Primary | ICD-10-CM

## 2019-04-08 DIAGNOSIS — E03.9 ACQUIRED HYPOTHYROIDISM: ICD-10-CM

## 2019-04-08 PROBLEM — E88.810 METABOLIC SYNDROME: Status: ACTIVE | Noted: 2019-04-08

## 2019-04-08 PROCEDURE — 99213 OFFICE O/P EST LOW 20 MIN: CPT | Performed by: INTERNAL MEDICINE

## 2019-04-08 RX ORDER — AMLODIPINE BESYLATE 5 MG/1
5 TABLET ORAL DAILY
Qty: 90 TABLET | Refills: 3 | Status: SHIPPED | OUTPATIENT
Start: 2019-04-08 | End: 2020-04-15 | Stop reason: SDUPTHER

## 2019-04-08 RX ORDER — LOSARTAN POTASSIUM 100 MG/1
100 TABLET ORAL DAILY
Qty: 90 TABLET | Refills: 3 | Status: SHIPPED | OUTPATIENT
Start: 2019-04-08 | End: 2020-04-15 | Stop reason: SDUPTHER

## 2019-04-08 RX ORDER — NEBIVOLOL 5 MG/1
2.5 TABLET ORAL DAILY
Qty: 28 TABLET | Refills: 0 | COMMUNITY
Start: 2019-04-08 | End: 2019-09-26 | Stop reason: SDUPTHER

## 2019-04-08 RX ORDER — LEVOTHYROXINE SODIUM 112 UG/1
112 TABLET ORAL DAILY
COMMUNITY

## 2019-04-08 NOTE — PROGRESS NOTES
Chief Complaint   Patient presents with   • Follow-up     for cardiac management   • ER visit     3/21/19, diagnosed with the flu, pt brought labs from that visit. Total cholesterol was 171.    • Med Refill     refills needed on [facility] meds[facility], [facility]. She gets Bystolic samples here.    • Medication Problem     Praluent is no longer covered on her insurance. Cinthya is checking on PA.    • Aspirin     does not take a daily aspirin, stopped after several nose bleeds       CARDIAC COMPLAINTS  dyspnea and fatigue        Subjective   Yessenia Baxter is a 59 y.o. female came in today for her follow-up visit.  She has history of hypertension, hypercholesterolemia we will also has moderate aortic regurgitation and also moderate pulmonary hypertension.  Her last echocardiogram done in 2017 showed no major changes compared to 2016.  She came today for follow-up visit with no new symptoms other than cough and shortness of breath.  She was diagnosed with flu about 2 weeks ago and was treated with Tamiflu.  Her labs did not include lipids.  She is not able to tolerate any statins and she takes Praluent.  Apparently it is not covered by her insurance now.              Cardiac History  Past Surgical History:   Procedure Laterality Date   • CARDIOVASCULAR STRESS TEST  09/16/2015    R. Stress- ([facility]) 6 Min, 90% THR. BP- 204/100. Few PVC's. Negative    • CARDIOVASCULAR STRESS TEST  11/28/2016    @[facility]. 6 Min, 89%THR. BP- 210/106. Negative   • ECHO - CONVERTED  09/16/2015     Echo- ([facility]) EF 60%. Mod AI. RVSP- 50 mmHg    • ECHO - CONVERTED  11/28/2016    @[facility]. EF 65%. Mild- Mod AI. RVSP-51 mmHg   • ECHO - CONVERTED  11/21/2017    @[facility]. EF 65%. Mild- Mod AI. Mild MR. RVSP- 54 mmHg       Current Outpatient Medications   Medication Sig Dispense Refill   • Acetaminophen (TYLENOL ARTHRITIS PAIN PO) Take  by mouth.     • amLODIPine (NORVASC) 5 MG tablet Take 1 tablet by mouth Daily. 90 tablet 3   • BLACK COHOSH PO Take  by mouth.      • celecoxib (CeleBREX) 200 MG capsule Take 200 mg by mouth Daily.     • Cyanocobalamin (VITAMIN B-12 IJ) Inject  as directed Every 14 (Fourteen) Days.     • levothyroxine (SYNTHROID, LEVOTHROID) 112 MCG tablet Take 112 mcg by mouth Daily.     • losartan (COZAAR) 100 MG tablet Take 1 tablet by mouth Daily. 90 tablet 3   • Multiple Vitamin (MULTI VITAMIN PO) Take  by mouth Daily.     • Probiotic Product (SOLUBLE FIBER/PROBIOTICS PO) Take  by mouth.     • VITAMIN A PO Take  by mouth.     • vitamin D (ERGOCALCIFEROL) 56470 UNITS capsule capsule Take 50,000 Units by mouth 1 (One) Time Per Week.     • nebivolol (BYSTOLIC) 5 MG tablet Take 0.5 tablets by mouth Daily. 28 tablet 0   • PRALUENT 150 MG/ML solution pen-injector Inject 1 mL under the skin into the appropriate area as directed Every 14 (Fourteen) Days. 2 pen 0     No current facility-administered medications for this visit.        Allergies  :  Crestor [rosuvastatin calcium]; Demerol [meperidine]; Latex; Other; Penicillins; Simvastatin; and Tetracyclines & related       Past Medical History:   Diagnosis Date   • Arthritis    • Cyst removed from right hand    • Esophageal reflux    • Fibromyalgia    • H/O shoulder surgery     right   • H/O: hysterectomy    • History of cholecystectomy    • Hypercholesterolemia    • Hypertension    • Hypothyroidism    • Left knee surgery twice    • Sarcoidosis        Social History     Socioeconomic History   • Marital status:      Spouse name: Not on file   • Number of children: Not on file   • Years of education: Not on file   • Highest education level: Not on file   Tobacco Use   • Smoking status: Never Smoker   • Smokeless tobacco: Never Used   Substance and Sexual Activity   • Alcohol use: No   • Drug use: No       Family History   Problem Relation Age of Onset   • Stroke Mother 76   • Other Mother         History of A Fib.   • Heart attack Father    • Heart failure Father    • Other Other          one at age 43 had  "AVR. one had stents at 67,        Review of Systems   Constitution: Positive for malaise/fatigue. Negative for decreased appetite.   HENT: Negative for congestion and sore throat.    Eyes: Negative for blurred vision.   Cardiovascular: Positive for chest pain and dyspnea on exertion.   Respiratory: Positive for cough and shortness of breath. Negative for snoring.    Endocrine: Negative for cold intolerance and heat intolerance.   Hematologic/Lymphatic: Negative for adenopathy. Does not bruise/bleed easily.   Skin: Negative for itching, nail changes and skin cancer.   Musculoskeletal: Negative for arthritis and myalgias.   Gastrointestinal: Negative for abdominal pain, dysphagia and heartburn.   Genitourinary: Negative for bladder incontinence and frequency.   Neurological: Negative for dizziness, light-headedness, seizures and vertigo.   Psychiatric/Behavioral: Negative for altered mental status.   Allergic/Immunologic: Negative for environmental allergies and hives.       Diabetes- No  Thyroid- abnormal    Objective     /62   Pulse 64   Ht 172.7 cm (68\")   Wt 91.6 kg (202 lb)   BMI 30.71 kg/m²     Physical Exam   Constitutional: She is oriented to person, place, and time. She appears well-developed and well-nourished.   HENT:   Head: Normocephalic.   Nose: Nose normal.   Eyes: EOM are normal. Pupils are equal, round, and reactive to light.   Neck: Normal range of motion. Neck supple.   Cardiovascular: Normal rate, regular rhythm, S1 normal and S2 normal.   Murmur heard.  Pulmonary/Chest: Effort normal and breath sounds normal.   Abdominal: Soft. Bowel sounds are normal.   Musculoskeletal: Normal range of motion. She exhibits no edema.   Neurological: She is alert and oriented to person, place, and time.   Skin: Skin is warm and dry.   Psychiatric: She has a normal mood and affect.     Procedures            Assessment/Plan   Patient's Body mass index is 30.71 kg/m². BMI is above normal parameters. " Recommendations include: educational material, exercise counseling and nutrition counseling.     Yessenia was seen today for follow-up, er visit, med refill, medication problem and aspirin.    Diagnoses and all orders for this visit:    Essential hypertension  -     amLODIPine (NORVASC) 5 MG tablet; Take 1 tablet by mouth Daily.  -     losartan (COZAAR) 100 MG tablet; Take 1 tablet by mouth Daily.    Hypercholesteremia    Shortness of breath    Sarcoidosis of lung (CMS/HCC)    Sleep apnea in adult    Acquired hypothyroidism    PHT (pulmonary hypertension) (CMS/HCC)    Metabolic syndrome         At baseline at heart rate and blood pressure appears stable.  Her BMI is still around 31.  She lost about 7 pounds in the last 6 months.  I had a very long talk with her about her diet.  I gave her papers on Mediterranean diet.  At this time continue to calcium channel blockers and ARB.  Given samples of the Bystolic and Praluent.  Continue the other medicines.  Recheck her labs.  During her next visit, need to schedule cuff an echocardiogram to reevaluate the aortic valvular regurgitation.  I will see her back in 6 months or sooner.                Electronically signed by Ja Bojorquez MD April 8, 2019 5:53 PM

## 2019-04-08 NOTE — PATIENT INSTRUCTIONS
Mediterranean Diet  A Mediterranean diet refers to food and lifestyle choices that are based on the traditions of countries located on the [place]. This way of eating has been shown to help prevent certain conditions and improve outcomes for people who have chronic diseases, like kidney disease and heart disease.  What are tips for following this plan?  Lifestyle  · Cook and eat meals together with your family, when possible.  · Drink enough fluid to keep your urine clear or pale yellow.  · Be physically active every day. This includes:  ? Aerobic exercise like running or swimming.  ? Leisure activities like gardening, walking, or housework.  · Get 7-8 hours of sleep each night.  · If recommended by your health care provider, drink red wine in moderation. This means 1 glass a day for nonpregnant women and 2 glasses a day for men. A glass of wine equals 5 oz (150 mL).  Reading food labels  · Check the serving size of packaged foods. For foods such as rice and pasta, the serving size refers to the amount of cooked product, not dry.  · Check the total fat in packaged foods. Avoid foods that have saturated fat or trans fats.  · Check the ingredients list for added sugars, such as corn syrup.  Shopping  · At the grocery store, buy most of your food from the areas near the walls of the store. This includes:  ? Fresh fruits and vegetables (produce).  ? Grains, beans, nuts, and seeds. Some of these may be available in unpackaged forms or large amounts (in bulk).  ? Fresh seafood.  ? Poultry and eggs.  ? Low-fat dairy products.  · Buy whole ingredients instead of prepackaged foods.  · Buy fresh fruits and vegetables in-season from local farmers markets.  · Buy frozen fruits and vegetables in resealable bags.  · If you do not have access to quality fresh seafood, buy precooked frozen shrimp or canned fish, such as tuna, salmon, or sardines.  · Buy small amounts of raw or cooked vegetables, salads, or olives from the  deli or salad bar at your store.  · Stock your pantry so you always have certain foods on hand, such as olive oil, canned tuna, canned tomatoes, rice, pasta, and beans.  Cooking  · Cook foods with extra-virgin olive oil instead of using butter or other vegetable oils.  · Have meat as a side dish, and have vegetables or grains as your main dish. This means having meat in small portions or adding small amounts of meat to foods like pasta or stew.  · Use beans or vegetables instead of meat in common dishes like chili or lasagna.  · [place] with different cooking methods. Try roasting or broiling vegetables instead of steaming or sautéeing them.  · Add frozen vegetables to soups, stews, pasta, or rice.  · Add nuts or seeds for added healthy fat at each meal. You can add these to yogurt, salads, or vegetable dishes.  · Marinate fish or vegetables using olive oil, lemon juice, garlic, and fresh herbs.  Meal planning  · Plan to eat 1 vegetarian meal one day each week. Try to work up to 2 vegetarian meals, if possible.  · Eat seafood 2 or more times a week.  · Have healthy snacks readily available, such as:  ? Vegetable sticks with hummus.  ? Greek yogurt.  ? Fruit and nut trail mix.  · Eat balanced meals throughout the week. This includes:  ? Fruit: 2-3 servings a day  ? Vegetables: 4-5 servings a day  ? Low-fat dairy: 2 servings a day  ? Fish, poultry, or lean meat: 1 serving a day  ? Beans and legumes: 2 or more servings a week  ? Nuts and seeds: 1-2 servings a day  ? Whole grains: 6-8 servings a day  ? Extra-virgin olive oil: 3-4 servings a day  · Limit red meat and sweets to only a few servings a month  What are my food choices?  · Mediterranean diet  ? Recommended  ? Grains: Whole-grain pasta. Brown rice. Bulgar wheat. Polenta. Couscous. Whole-wheat bread. Oatmeal. Quinoa.  ? Vegetables: Artichokes. Beets. Broccoli. Cabbage. Carrots. Eggplant. Green beans. Chard. Kale. Spinach. Onions. Leeks. Peas. Squash.  Tomatoes. Peppers. Radishes.  ? Fruits: Apples. Apricots. Avocado. Berries. Bananas. Cherries. Dates. Figs. Grapes. Gurmeet. Melon. Oranges. Peaches. Plums. Pomegranate.  ? Meats and other protein foods: Beans. Almonds. Sunflower seeds. Pine nuts. Peanuts. Cod. [place]. Scallops. Shrimp. Tuna. Tilapia. Clams. Oysters. Eggs.  ? Dairy: Low-fat milk. Cheese. Greek yogurt.  ? Beverages: Water. Red wine. Herbal tea.  ? Fats and oils: Extra virgin olive oil. Avocado oil. Grape seed oil.  ? Sweets and desserts: Greek yogurt with honey. Baked apples. Poached pears. Trail mix.  ? Seasoning and other foods: Basil. Cilantro. Coriander. Cumin. Mint. Parsley. Justin. Rosemary. Tarragon. Garlic. Oregano. Thyme. Pepper. Balsalmic vinegar. Tahini. Hummus. Tomato sauce. Olives. Mushrooms.  ? Limit these  ? Grains: Prepackaged pasta or rice dishes. Prepackaged cereal with added sugar.  ? Vegetables: Deep fried potatoes (french fries).  ? Fruits: Fruit canned in syrup.  ? Meats and other protein foods: Beef. Pork. Lamb. Poultry with skin. Hot dogs. Ricks.  ? Dairy: Ice cream. Sour cream. Whole milk.  ? Beverages: Juice. Sugar-sweetened soft drinks. Beer. Liquor and spirits.  ? Fats and oils: Butter. Canola oil. Vegetable oil. Beef fat (tallow). Lard.  ? Sweets and desserts: Cookies. Cakes. Pies. Candy.  ? Seasoning and other foods: Mayonnaise. Premade sauces and marinades.  ? The items listed may not be a complete list. Talk with your dietitian about what dietary choices are right for you.  Summary  · The Mediterranean diet includes both food and lifestyle choices.  · Eat a variety of fresh fruits and vegetables, beans, nuts, seeds, and whole grains.  · Limit the amount of red meat and sweets that you eat.  · Talk with your health care provider about whether it is safe for you to drink red wine in moderation. This means 1 glass a day for nonpregnant women and 2 glasses a day for men. A glass of wine equals 5 oz (150 mL).  This information  is not intended to replace advice given to you by your health care provider. Make sure you discuss any questions you have with your health care provider.  Document Released: 08/10/2017 Document Revised: 09/12/2017 Document Reviewed: 08/10/2017  [employer] Interactive Patient Education © 2019 [employer]

## 2019-04-09 ENCOUNTER — TELEPHONE (OUTPATIENT)
Dept: CARDIOLOGY | Facility: CLINIC | Age: 59
End: 2019-04-09

## 2019-04-09 NOTE — TELEPHONE ENCOUNTER
Yesterday while patient here for visit, she mentioned that she received a letter from [facility] that Praluent was not covered under her plan and could not get this medicine.    Upon calling [facility], I learned that the problem was that her PA  on 2018 and no refills were given since 2018. Patient did not call for refills, she had received a 3 month supply and had some left from an earlier fill and just did not request refills.Therefore, after speaking with Ambreen all that is needed is a new PA and script if approved.

## 2019-09-26 ENCOUNTER — TELEPHONE (OUTPATIENT)
Dept: CARDIOLOGY | Facility: CLINIC | Age: 59
End: 2019-09-26

## 2019-09-26 RX ORDER — NEBIVOLOL 5 MG/1
2.5 TABLET ORAL DAILY
Qty: 14 TABLET | Refills: 0 | COMMUNITY
Start: 2019-09-26 | End: 2020-01-28 | Stop reason: SDUPTHER

## 2019-09-26 NOTE — TELEPHONE ENCOUNTER
Patient called to report that Losartan has been recalled as it could cause cancer. I called her pharmacy [facility] and automated message about this said that they have not dispensed any of the medication that has been recalled.    LM explaining this to patient. Advised to call with questions

## 2019-10-14 ENCOUNTER — OFFICE VISIT (OUTPATIENT)
Dept: CARDIOLOGY | Facility: CLINIC | Age: 59
End: 2019-10-14

## 2019-10-14 VITALS
HEART RATE: 65 BPM | WEIGHT: 207 LBS | HEIGHT: 68 IN | SYSTOLIC BLOOD PRESSURE: 148 MMHG | BODY MASS INDEX: 31.37 KG/M2 | DIASTOLIC BLOOD PRESSURE: 98 MMHG

## 2019-10-14 DIAGNOSIS — D86.0 SARCOIDOSIS OF LUNG (HCC): ICD-10-CM

## 2019-10-14 DIAGNOSIS — I27.20 PHT (PULMONARY HYPERTENSION) (HCC): ICD-10-CM

## 2019-10-14 DIAGNOSIS — I10 ESSENTIAL HYPERTENSION: Primary | ICD-10-CM

## 2019-10-14 DIAGNOSIS — E78.00 HYPERCHOLESTEREMIA: ICD-10-CM

## 2019-10-14 DIAGNOSIS — R06.02 SHORTNESS OF BREATH: ICD-10-CM

## 2019-10-14 DIAGNOSIS — R01.1 MURMUR, CARDIAC: ICD-10-CM

## 2019-10-14 DIAGNOSIS — I35.1 AORTIC VALVE INSUFFICIENCY, ETIOLOGY OF CARDIAC VALVE DISEASE UNSPECIFIED: ICD-10-CM

## 2019-10-14 DIAGNOSIS — E03.9 ACQUIRED HYPOTHYROIDISM: ICD-10-CM

## 2019-10-14 PROCEDURE — 99213 OFFICE O/P EST LOW 20 MIN: CPT | Performed by: INTERNAL MEDICINE

## 2019-10-14 NOTE — PROGRESS NOTES
Chief Complaint   Patient presents with   • Follow-up     cardiac management.  walking 1.5 miles 3 times per week.   • Labs     pt brought copy of 8/30/19 labs   • ASA     not on ASA due to nosebleeds   • other     /98.  usually takes norvasc at 2 pm, she has not taken yet.   • Med Refill     no refills needed.       CARDIAC COMPLAINTS  dyspnea        Subjective   Yessenia Baxter is a 59 y.o. female came in today for her follow-up visit.  She has history of hypertension, hypercholesterolemia who is intolerant to almost all the statins.  She also has aortic regurgitation and mild to moderate pulmonary hypertension being managed conservatively.  Her last echocardiogram was in 2017.  She is not interested in having any more procedures done because of the expense.  She came today apparently she has been exercising more regularly now walking about 1.5 miles a day both 3-4 times a week.  She has some occasional shortness of breath and feeling fatigued but otherwise she is doing much better she did bring copy of her labs with her and the sodium and potassium is normal.  Her cholesterol is 164 with the LDL of 69.  Her TSH is normal.              Cardiac History  Past Surgical History:   Procedure Laterality Date   • CARDIOVASCULAR STRESS TEST  09/16/2015    R. Stress- ([facility]) 6 Min, 90% THR. BP- 204/100. Few PVC's. Negative    • CARDIOVASCULAR STRESS TEST  11/28/2016    @[facility]. 6 Min, 89%THR. BP- 210/106. Negative   • ECHO - CONVERTED  09/16/2015     Echo- ([facility]) EF 60%. Mod AI. RVSP- 50 mmHg    • ECHO - CONVERTED  11/28/2016    @[facility]. EF 65%. Mild- Mod AI. RVSP-51 mmHg   • ECHO - CONVERTED  11/21/2017    @[facility]. EF 65%. Mild- Mod AI. Mild MR. RVSP- 54 mmHg       Current Outpatient Medications   Medication Sig Dispense Refill   • Acetaminophen (TYLENOL ARTHRITIS PAIN PO) Take  by mouth.     • amLODIPine (NORVASC) 5 MG tablet Take 1 tablet by mouth Daily. 90 tablet 3   • BLACK COHOSH PO Take  by mouth.     • celecoxib (CeleBREX)  200 MG capsule Take 200 mg by mouth Daily.     • Cyanocobalamin (VITAMIN B-12 IJ) Inject  as directed Every 14 (Fourteen) Days.     • levothyroxine (SYNTHROID, LEVOTHROID) 112 MCG tablet Take 112 mcg by mouth Daily.     • losartan (COZAAR) 100 MG tablet Take 1 tablet by mouth Daily. 90 tablet 3   • Multiple Vitamin (MULTI VITAMIN PO) Take  by mouth Daily.     • nebivolol (BYSTOLIC) 5 MG tablet Take 0.5 tablets by mouth Daily. 14 tablet 0   • PRALUENT 150 MG/ML solution pen-injector Inject 1 mL under the skin into the appropriate area as directed Every 14 (Fourteen) Days. 1 pen 0   • Probiotic Product (SOLUBLE FIBER/PROBIOTICS PO) Take  by mouth.     • VITAMIN A PO Take  by mouth.     • vitamin D (ERGOCALCIFEROL) 69995 UNITS capsule capsule Take 50,000 Units by mouth Every 14 (Fourteen) Days.       No current facility-administered medications for this visit.        Allergies  :  Aspirin; Crestor [rosuvastatin calcium]; Demerol [meperidine]; Latex; Other; Penicillins; Simvastatin; and Tetracyclines & related       Past Medical History:   Diagnosis Date   • Arthritis    • Cyst removed from right hand    • Esophageal reflux    • Fibromyalgia    • H/O shoulder surgery     right   • H/O: hysterectomy    • History of cholecystectomy    • Hypercholesterolemia    • Hypertension    • Hypothyroidism    • Left knee surgery twice    • Sarcoidosis        Social History     Socioeconomic History   • Marital status:      Spouse name: Not on file   • Number of children: Not on file   • Years of education: Not on file   • Highest education level: Not on file   Tobacco Use   • Smoking status: Never Smoker   • Smokeless tobacco: Never Used   Substance and Sexual Activity   • Alcohol use: No   • Drug use: No       Family History   Problem Relation Age of Onset   • Stroke Mother 76   • Other Mother         History of A Fib.   • Heart attack Father    • Heart failure Father    • Other Other          one at age 43 had AVR. one had  "stents at 67,        Review of Systems   Constitution: Positive for malaise/fatigue. Negative for decreased appetite.   HENT: Negative for congestion and sore throat.    Eyes: Negative for blurred vision.   Cardiovascular: Positive for dyspnea on exertion. Negative for chest pain.   Respiratory: Positive for shortness of breath. Negative for snoring.    Endocrine: Negative for cold intolerance and heat intolerance.   Hematologic/Lymphatic: Negative for adenopathy. Does not bruise/bleed easily.   Skin: Negative for itching, nail changes and skin cancer.   Musculoskeletal: Positive for arthritis and joint pain. Negative for myalgias.   Gastrointestinal: Negative for abdominal pain, dysphagia and heartburn.   Genitourinary: Negative for bladder incontinence and frequency.   Neurological: Negative for dizziness, light-headedness, seizures and vertigo.   Psychiatric/Behavioral: Negative for altered mental status.   Allergic/Immunologic: Negative for environmental allergies and hives.       Diabetes- No  Thyroid- Abnormal    Objective     /98 (BP Location: Left arm)   Pulse 65   Ht 172.7 cm (68\")   Wt 93.9 kg (207 lb)   BMI 31.47 kg/m²     Physical Exam   Constitutional: She is oriented to person, place, and time. She appears well-developed and well-nourished.   HENT:   Head: Normocephalic.   Nose: Nose normal.   Eyes: EOM are normal. Pupils are equal, round, and reactive to light.   Neck: Normal range of motion. Neck supple.   Cardiovascular: Normal rate, regular rhythm, S1 normal and S2 normal.   Murmur heard.  Pulmonary/Chest: Effort normal and breath sounds normal.   Abdominal: Soft. Bowel sounds are normal.   Musculoskeletal: Normal range of motion. She exhibits no edema.   Neurological: She is alert and oriented to person, place, and time.   Skin: Skin is warm and dry.   Psychiatric: She has a normal mood and affect.     Procedures            Assessment/Plan   Patient's Body mass index is 31.47 kg/m². BMI " is above normal parameters. Recommendations include: nutrition counseling.     Yessenia was seen today for follow-up, labs, asa, other and med refill.    Diagnoses and all orders for this visit:    Essential hypertension    Hypercholesteremia    PHT (pulmonary hypertension) (CMS/HCC)    Sarcoidosis of lung (CMS/HCC)    Shortness of breath    Murmur, cardiac    Aortic valve insufficiency, etiology of cardiac valve disease unspecified    Acquired hypothyroidism    At baseline her heart rate is stable but the blood pressure is slightly elevated.  Her BMI is still up at 31.  Her clinical examination reveals slightly loud second heart sound early diastolic murmur at the mitral area.    Her blood pressure is still elevated she apparently has not taken her afternoon dose of amlodipine.  She is advised to take it in time.  She is also advised to cut down on the sodium intake which she has done.    Regarding her hypercholesterolemia she is not able to tolerate any statins but she has been taking Praluent.  At this time continue the same.    Regarding the shortness of breath it could be related to the sarcoidosis of the lungs.  I did talk to her again about the echocardiogram and she may try to get it done next year..    Regarding her thyroid the TSH is well controlled so continue the same.  At this time she does not need any refills she will call us if anything needed.  I will see her back in 6 months or sooner if needed.                    Electronically signed by Ja Bojorquez MD October 14, 2019 3:43 PM

## 2020-01-28 RX ORDER — NEBIVOLOL 5 MG/1
2.5 TABLET ORAL DAILY
Qty: 90 TABLET | Refills: 3 | Status: SHIPPED | OUTPATIENT
Start: 2020-01-28 | End: 2020-06-25

## 2020-01-31 ENCOUNTER — TELEPHONE (OUTPATIENT)
Dept: CARDIOLOGY | Facility: CLINIC | Age: 60
End: 2020-01-31

## 2020-01-31 RX ORDER — CARVEDILOL 3.12 MG/1
3.12 TABLET ORAL 2 TIMES DAILY
Qty: 60 TABLET | Refills: 11 | Status: SHIPPED | OUTPATIENT
Start: 2020-01-31 | End: 2020-06-25

## 2020-01-31 NOTE — TELEPHONE ENCOUNTER
Patient called and reports that she is taking Bystolic  But d/t cost she cannot afford it . we give samples as available but we have none and she tried to use prescription card with no luck . She said you had discussed at one time switching to something more cost effective  . Would you like to change it at this time?

## 2020-01-31 NOTE — TELEPHONE ENCOUNTER
Patient made aware to begin Coreg 3.125 mg BID , prescription sent to [facility] pharmacy in [place] .

## 2020-01-31 NOTE — TELEPHONE ENCOUNTER
Patient  aware  To begin Coreg 3.125mg  To replace Bystolic  , d/t cost . Prescription sent to [facility] pharmacy in [place] .

## 2020-04-13 ENCOUNTER — TELEPHONE (OUTPATIENT)
Dept: CARDIOLOGY | Facility: CLINIC | Age: 60
End: 2020-04-13

## 2020-04-13 DIAGNOSIS — I10 ESSENTIAL HYPERTENSION: ICD-10-CM

## 2020-04-15 RX ORDER — LOSARTAN POTASSIUM 100 MG/1
100 TABLET ORAL DAILY
Qty: 90 TABLET | Refills: 3 | Status: SHIPPED | OUTPATIENT
Start: 2020-04-15 | End: 2020-04-21 | Stop reason: SDUPTHER

## 2020-04-15 RX ORDER — AMLODIPINE BESYLATE 5 MG/1
5 TABLET ORAL DAILY
Qty: 90 TABLET | Refills: 3 | Status: SHIPPED | OUTPATIENT
Start: 2020-04-15 | End: 2020-04-21 | Stop reason: SDUPTHER

## 2020-04-21 DIAGNOSIS — I10 ESSENTIAL HYPERTENSION: ICD-10-CM

## 2020-04-21 RX ORDER — AMLODIPINE BESYLATE 5 MG/1
5 TABLET ORAL DAILY
Qty: 90 TABLET | Refills: 3 | Status: SHIPPED | OUTPATIENT
Start: 2020-04-21 | End: 2020-06-25

## 2020-04-21 RX ORDER — LOSARTAN POTASSIUM 100 MG/1
100 TABLET ORAL DAILY
Qty: 90 TABLET | Refills: 3 | Status: SHIPPED | OUTPATIENT
Start: 2020-04-21 | End: 2020-06-25 | Stop reason: SDUPTHER

## 2020-04-21 NOTE — TELEPHONE ENCOUNTER
Received faxed refill request from [facility] on patient's losartan and amlodipine.  Refills sent.

## 2020-06-25 ENCOUNTER — OFFICE VISIT (OUTPATIENT)
Dept: CARDIOLOGY | Facility: CLINIC | Age: 60
End: 2020-06-25

## 2020-06-25 VITALS
SYSTOLIC BLOOD PRESSURE: 148 MMHG | HEIGHT: 68 IN | HEART RATE: 72 BPM | DIASTOLIC BLOOD PRESSURE: 102 MMHG | WEIGHT: 209 LBS | BODY MASS INDEX: 31.67 KG/M2 | TEMPERATURE: 98.2 F

## 2020-06-25 DIAGNOSIS — G47.30 SLEEP APNEA IN ADULT: ICD-10-CM

## 2020-06-25 DIAGNOSIS — I27.20 PHT (PULMONARY HYPERTENSION) (HCC): ICD-10-CM

## 2020-06-25 DIAGNOSIS — I10 ESSENTIAL HYPERTENSION: Primary | ICD-10-CM

## 2020-06-25 DIAGNOSIS — E03.9 ACQUIRED HYPOTHYROIDISM: ICD-10-CM

## 2020-06-25 DIAGNOSIS — R06.02 SHORTNESS OF BREATH: ICD-10-CM

## 2020-06-25 DIAGNOSIS — E88.81 METABOLIC SYNDROME: ICD-10-CM

## 2020-06-25 DIAGNOSIS — R60.0 BILATERAL LEG EDEMA: ICD-10-CM

## 2020-06-25 DIAGNOSIS — E78.00 HYPERCHOLESTEREMIA: ICD-10-CM

## 2020-06-25 DIAGNOSIS — R07.2 PRECORDIAL PAIN: ICD-10-CM

## 2020-06-25 DIAGNOSIS — R01.1 MURMUR, CARDIAC: ICD-10-CM

## 2020-06-25 DIAGNOSIS — I35.1 AORTIC VALVE INSUFFICIENCY, ETIOLOGY OF CARDIAC VALVE DISEASE UNSPECIFIED: ICD-10-CM

## 2020-06-25 DIAGNOSIS — D86.0 SARCOIDOSIS OF LUNG (HCC): ICD-10-CM

## 2020-06-25 DIAGNOSIS — R00.2 PALPITATIONS: ICD-10-CM

## 2020-06-25 PROCEDURE — 99214 OFFICE O/P EST MOD 30 MIN: CPT | Performed by: INTERNAL MEDICINE

## 2020-06-25 RX ORDER — CARVEDILOL 6.25 MG/1
6.25 TABLET ORAL 2 TIMES DAILY
Qty: 180 TABLET | Refills: 3 | Status: SHIPPED | OUTPATIENT
Start: 2020-06-25 | End: 2020-09-22 | Stop reason: DRUGHIGH

## 2020-06-25 RX ORDER — LOSARTAN POTASSIUM 100 MG/1
100 TABLET ORAL DAILY
Qty: 90 TABLET | Refills: 3 | Status: SHIPPED | OUTPATIENT
Start: 2020-06-25 | End: 2021-04-01 | Stop reason: SDUPTHER

## 2020-06-25 RX ORDER — CETIRIZINE HYDROCHLORIDE 5 MG/1
5 TABLET ORAL DAILY PRN
COMMUNITY
End: 2021-04-01

## 2020-06-25 NOTE — PROGRESS NOTES
"Chief Complaint   Patient presents with   • Follow-up     for cardiac management   • Med Refill     refills needed on cardiac meds, 90 days    • Labs     most recent labs from August is in chart.    • Chest Pain     \"feels like an electrical shock in my chest\", has randomly occured twice. No other symptoms associated   • Medication Problem     unable to afford the Praluent, has changed insurance, said it is not covered.  Bystolic changed to Coreg due to insurance.    • Hypertension     has not taken her Coreg today       CARDIAC COMPLAINTS  chest pressure/discomfort and fatigue        Subjective   Yessenia Baxter is a 60 y.o. female came in today for her regular follow-up visit.  She has history of hypertension, hypercholesterolemia who is not able to tolerate most of the statins.  She also has valvular heart disease in the form of aortic regurgitation and mild pulmonary pretension.  She came today for her regular follow-up visit.  She has done fairly well with Bystolic in the past.  Apparently secondary to problem with insurance it was changed to Coreg.  She also has been taking PCSK9 inhibitor for her cholesterol in the past.  Her last lab work which was done in September of last year showed it was 164 with the LDL of 69.  She at this time is not taking any statins.  She has been having some occasional chest pain.  The pain is more sharp in nature gets occasionally without any precipitating factors.  She is trying to walk at least a mile a day.  She also is not taking her Coreg regularly.  She does complain of having some leg edema.              Cardiac History  Past Surgical History:   Procedure Laterality Date   • CARDIOVASCULAR STRESS TEST  09/16/2015    R. Stress- ([facility]) 6 Min, 90% THR. BP- 204/100. Few PVC's. Negative    • CARDIOVASCULAR STRESS TEST  11/28/2016    @[facility]. 6 Min, 89%THR. BP- 210/106. Negative   • ECHO - CONVERTED  09/16/2015     Echo- ([facility]) EF 60%. Mod AI. RVSP- 50 mmHg    • ECHO - CONVERTED  " 11/28/2016    @[facility]. EF 65%. Mild- Mod AI. RVSP-51 mmHg   • ECHO - CONVERTED  11/21/2017    @[facility].  65%. Mild- Mod AI. Mild MR. RVSP- 54 mmHg       Current Outpatient Medications   Medication Sig Dispense Refill   • Acetaminophen (TYLENOL ARTHRITIS PAIN PO) Take  by mouth.     • BLACK COHOSH PO Take  by mouth.     • celecoxib (CeleBREX) 200 MG capsule Take 200 mg by mouth Daily.     • cetirizine (zyrTEC) 5 MG tablet Take 5 mg by mouth Daily As Needed for Allergies.     • Cyanocobalamin (VITAMIN B-12 IJ) Inject  as directed Every 14 (Fourteen) Days.     • levothyroxine (SYNTHROID, LEVOTHROID) 112 MCG tablet Take 112 mcg by mouth Daily.     • losartan (COZAAR) 100 MG tablet Take 1 tablet by mouth Daily. 90 tablet 3   • Multiple Vitamin (MULTI VITAMIN PO) Take  by mouth Daily.     • Probiotic Product (SOLUBLE FIBER/PROBIOTICS PO) Take  by mouth.     • VITAMIN A PO Take  by mouth.     • vitamin D (ERGOCALCIFEROL) 86920 UNITS capsule capsule Take 50,000 Units by mouth Every 14 (Fourteen) Days.     • Alirocumab 150 MG/ML solution auto-injector Inject 1 mL under the skin into the appropriate area as directed Every 14 (Fourteen) Days. 1 pen 0   • carvedilol (COREG) 6.25 MG tablet Take 1 tablet by mouth 2 (Two) Times a Day. 180 tablet 3     No current facility-administered medications for this visit.        Allergies  :  Aspirin; Crestor [rosuvastatin calcium]; Demerol [meperidine]; Latex; Other; Penicillins; Simvastatin; and Tetracyclines & related       Past Medical History:   Diagnosis Date   • Arthritis    • Cyst removed from right hand    • Esophageal reflux    • Fibromyalgia    • H/O shoulder surgery     right   • H/O: hysterectomy    • History of cholecystectomy    • Hypercholesterolemia    • Hypertension    • Hypothyroidism    • Left knee surgery twice    • Sarcoidosis        Social History     Socioeconomic History   • Marital status:      Spouse name: Not on file   • Number of children: Not on file   • Years  "of education: Not on file   • Highest education level: Not on file   Tobacco Use   • Smoking status: Never Smoker   • Smokeless tobacco: Never Used   Substance and Sexual Activity   • Alcohol use: No   • Drug use: No       Family History   Problem Relation Age of Onset   • Stroke Mother 76   • Other Mother         History of A Fib.   • Heart attack Father    • Heart failure Father    • Other Other          one at age 43 had AVR. one had stents at 67,        Review of Systems   Constitution: Negative for decreased appetite and malaise/fatigue.   HENT: Negative for congestion and sore throat.    Eyes: Negative for blurred vision.   Cardiovascular: Positive for chest pain and leg swelling.   Respiratory: Negative for shortness of breath and snoring.    Endocrine: Negative for cold intolerance and heat intolerance.   Hematologic/Lymphatic: Negative for adenopathy. Does not bruise/bleed easily.   Skin: Negative for itching, nail changes and skin cancer.   Musculoskeletal: Negative for arthritis and myalgias.   Gastrointestinal: Negative for abdominal pain, dysphagia and heartburn.   Genitourinary: Negative for bladder incontinence and frequency.   Neurological: Negative for dizziness, light-headedness, seizures and vertigo.   Psychiatric/Behavioral: Negative for altered mental status.   Allergic/Immunologic: Negative for environmental allergies and hives.       Diabetes- No  Thyroid- abnormal    Objective     BP (!) 148/102   Pulse 72   Temp 98.2 °F (36.8 °C)   Ht 172.7 cm (68\")   Wt 94.8 kg (209 lb)   BMI 31.78 kg/m²     Physical Exam   Constitutional: She is oriented to person, place, and time. She appears well-developed and well-nourished.   HENT:   Head: Normocephalic.   Nose: Nose normal.   Eyes: Pupils are equal, round, and reactive to light. EOM are normal.   Neck: Normal range of motion. Neck supple.   Cardiovascular: Normal rate, regular rhythm, S1 normal and S2 normal.   Murmur heard.  Pulmonary/Chest: " Effort normal and breath sounds normal.   Abdominal: Soft. Bowel sounds are normal.   Musculoskeletal: Normal range of motion. She exhibits no edema.   Neurological: She is alert and oriented to person, place, and time.   Skin: Skin is warm and dry.   Psychiatric: She has a normal mood and affect.     Procedures            Assessment/Plan   Patient's Body mass index is 31.78 kg/m². BMI is above normal parameters. Recommendations include: nutrition counseling.     Yessenia was seen today for follow-up, med refill, labs, chest pain, medication problem and hypertension.    Diagnoses and all orders for this visit:    Essential hypertension  -     carvedilol (COREG) 6.25 MG tablet; Take 1 tablet by mouth 2 (Two) Times a Day.  -     losartan (COZAAR) 100 MG tablet; Take 1 tablet by mouth Daily.    Hypercholesteremia    Palpitations    Aortic valve insufficiency, etiology of cardiac valve disease unspecified    Murmur, cardiac    PHT (pulmonary hypertension) (CMS/HCC)    Shortness of breath    Sarcoidosis of lung (CMS/HCC)    Sleep apnea in adult    Acquired hypothyroidism    Metabolic syndrome    Precordial pain    Bilateral leg edema       At baseline her heart rate is stable.  Her blood pressure is still moderately elevated.  Her BMI is still around 32.  Her clinical examination is unremarkable other than the early diastolic murmur at the aortic area.  She does have trace pedal edema.    Regarding her hypertension, I did increase the dose of Coreg to 6.25 mg twice a day.  I advised her to completely stop the amlodipine which is causing her the edema.  I explained to her the importance of taking the medications regularly.    Regarding the hypercholesterolemia, at this time she is not taking any statins.  She is going to have her lab work done again during the fall festival at [facility].  I advised her to get me a copy of the labs.    Regarding the shortness of breath, it could be combination of sarcoidosis as well as  from the obesity.  I talked to her again about cutting down on the carbohydrate intake    Regarding the thyroid problem, she is being followed with you.  She is going to have a TSH level checked again.    The chest pain she describes appears to be very atypical.  At this time I do not think she needs any cardiac work-up    The leg edema she has could be related to amlodipine.  Hopefully stopping it should help it.  I also advised her to cut down on the salt intake.    I will see her back in 6 months or sooner if needed.                  Electronically signed by Ja Bojorquez MD June 25, 2020 14:55

## 2020-09-21 ENCOUNTER — TELEPHONE (OUTPATIENT)
Dept: CARDIOLOGY | Facility: CLINIC | Age: 60
End: 2020-09-21

## 2020-09-21 NOTE — TELEPHONE ENCOUNTER
Pt called. Started noticing a headache about 3 weeks ago. BP elevated daily now. From 133/92 up to 153/101  Pulse consistently in the 70's. She reports having tried taking 2 extra Coreg but it still hasn't brought it down. Was on amlodipine in the past but was stopped due to edema.     Current meds include:    Coreg 3.125 mg BID  Losartan 100 mg daily

## 2020-09-22 RX ORDER — CARVEDILOL 12.5 MG/1
12.5 TABLET ORAL 2 TIMES DAILY
Qty: 60 TABLET | Refills: 11 | Status: SHIPPED | OUTPATIENT
Start: 2020-09-22 | End: 2021-04-01 | Stop reason: SDUPTHER

## 2020-09-22 NOTE — TELEPHONE ENCOUNTER
After discussing with patient she looked at her bottle, she has actually been taking the 6.25 mg BID since June. Do you want her to increase to 12.5 mg BID?

## 2020-09-22 NOTE — TELEPHONE ENCOUNTER
Pt aware to increase Coreg to 12.5 mg BID, continue to monitor vitals and call if still having problems. She wanted a 30 day script to try, will call back if working for 90 days.

## 2021-04-01 ENCOUNTER — OFFICE VISIT (OUTPATIENT)
Dept: CARDIOLOGY | Facility: CLINIC | Age: 61
End: 2021-04-01

## 2021-04-01 VITALS
SYSTOLIC BLOOD PRESSURE: 122 MMHG | HEART RATE: 72 BPM | WEIGHT: 213.8 LBS | TEMPERATURE: 97.1 F | BODY MASS INDEX: 32.4 KG/M2 | HEIGHT: 68 IN | DIASTOLIC BLOOD PRESSURE: 82 MMHG

## 2021-04-01 DIAGNOSIS — I27.20 PHT (PULMONARY HYPERTENSION) (HCC): ICD-10-CM

## 2021-04-01 DIAGNOSIS — R01.1 MURMUR, CARDIAC: ICD-10-CM

## 2021-04-01 DIAGNOSIS — E78.00 HYPERCHOLESTEREMIA: ICD-10-CM

## 2021-04-01 DIAGNOSIS — I10 ESSENTIAL HYPERTENSION: Primary | ICD-10-CM

## 2021-04-01 DIAGNOSIS — I35.1 AORTIC VALVE INSUFFICIENCY, ETIOLOGY OF CARDIAC VALVE DISEASE UNSPECIFIED: ICD-10-CM

## 2021-04-01 DIAGNOSIS — E88.81 METABOLIC SYNDROME: ICD-10-CM

## 2021-04-01 DIAGNOSIS — D86.0 SARCOIDOSIS OF LUNG (HCC): ICD-10-CM

## 2021-04-01 DIAGNOSIS — E03.9 HYPOTHYROIDISM, UNSPECIFIED TYPE: ICD-10-CM

## 2021-04-01 PROCEDURE — 99214 OFFICE O/P EST MOD 30 MIN: CPT | Performed by: INTERNAL MEDICINE

## 2021-04-01 RX ORDER — CARVEDILOL 12.5 MG/1
12.5 TABLET ORAL 2 TIMES DAILY
Qty: 180 TABLET | Refills: 4 | Status: SHIPPED | OUTPATIENT
Start: 2021-04-01 | End: 2022-04-13

## 2021-04-01 RX ORDER — LOSARTAN POTASSIUM 100 MG/1
100 TABLET ORAL DAILY
Qty: 90 TABLET | Refills: 4 | Status: SHIPPED | OUTPATIENT
Start: 2021-04-01 | End: 2022-03-04 | Stop reason: SDUPTHER

## 2021-04-01 NOTE — PROGRESS NOTES
Chief Complaint   Patient presents with   • Follow-up     cardiac management. Pt denies updated EKG per insurance reasons. pt states she feels good today.   • Med Refill     will need cardiac meds refilled 90 days to [facility] in [place]. Discussed med list verbally.   • Labs     last labs 8/2019 in chart.   • Aspirin     is not on daily ASA       CARDIAC COMPLAINTS  Headache        Subjective   Yessenia Baxter is a 61 y.o. female came in today for her regular follow-up visit.  She has history of hypertension, hypercholesterolemia was not able to tolerate most of the statins.  She also has history of aortic insufficiency and mild pulmonary pretension.  She also has history of hypothyroidism for which she takes thyroid supplement and is being managed by her gynecologist.  She came today with no new symptoms.  She did call our office with the headache and noted the blood pressure was slightly elevated.  I did increase the dose of Coreg to 612.5 mg twice a day and after which the blood pressure is better and her headache is better.  She still not had any lipids checked for 2 years.  She used to be on a PCSK9 inhibitors.  At this time we were not getting any samples and it is not covered by her insurance.              Cardiac History  Past Surgical History:   Procedure Laterality Date   • CARDIOVASCULAR STRESS TEST  09/16/2015    R. Stress- ([facility]) 6 Min, 90% THR. BP- 204/100. Few PVC's. Negative    • CARDIOVASCULAR STRESS TEST  11/28/2016    @[facility]. 6 Min, 89%THR. BP- 210/106. Negative   • ECHO - CONVERTED  09/16/2015     Echo- ([facility]) EF 60%. Mod AI. RVSP- 50 mmHg    • ECHO - CONVERTED  11/28/2016    @[facility]. EF 65%. Mild- Mod AI. RVSP-51 mmHg   • ECHO - CONVERTED  11/21/2017    @[facility]. EF 65%. Mild- Mod AI. Mild MR. RVSP- 54 mmHg       Current Outpatient Medications   Medication Sig Dispense Refill   • Acetaminophen (TYLENOL ARTHRITIS PAIN PO) Take  by mouth.     • BLACK COHOSH PO Take  by mouth.     • carvedilol (COREG) 12.5 MG  tablet Take 1 tablet by mouth 2 (Two) Times a Day. 180 tablet 4   • celecoxib (CeleBREX) 200 MG capsule Take 200 mg by mouth Daily.     • Cyanocobalamin (VITAMIN B-12 IJ) Inject  as directed Every 14 (Fourteen) Days.     • levothyroxine (SYNTHROID, LEVOTHROID) 112 MCG tablet Take 112 mcg by mouth Daily.     • losartan (COZAAR) 100 MG tablet Take 1 tablet by mouth Daily. 90 tablet 4   • Multiple Vitamin (MULTI VITAMIN PO) Take  by mouth Daily.     • Probiotic Product (SOLUBLE FIBER/PROBIOTICS PO) Take  by mouth.     • VITAMIN A PO Take  by mouth.     • vitamin D (ERGOCALCIFEROL) 13109 UNITS capsule capsule Take 50,000 Units by mouth Every 14 (Fourteen) Days.       No current facility-administered medications for this visit.       Allergies  :  Aspirin, Crestor [rosuvastatin calcium], Demerol [meperidine], Latex, Other, Penicillins, Simvastatin, and Tetracyclines & related       Past Medical History:   Diagnosis Date   • Arthritis    • Cyst removed from right hand    • Esophageal reflux    • Fibromyalgia    • H/O shoulder surgery     right   • H/O: hysterectomy    • History of cholecystectomy    • Hypercholesterolemia    • Hypertension    • Hypothyroidism    • Left knee surgery twice    • Sarcoidosis        Social History     Socioeconomic History   • Marital status:      Spouse name: Not on file   • Number of children: Not on file   • Years of education: Not on file   • Highest education level: Not on file   Tobacco Use   • Smoking status: Never Smoker   • Smokeless tobacco: Never Used   Vaping Use   • Vaping Use: Never used   Substance and Sexual Activity   • Alcohol use: No   • Drug use: No       Family History   Problem Relation Age of Onset   • Stroke Mother 76   • Other Mother         History of A Fib.   • Heart attack Father    • Heart failure Father    • Other Other          one at age 43 had AVR. one had stents at 67,        Review of Systems   Constitutional: Negative for decreased appetite.   HENT:  "Negative for congestion and sore throat.    Eyes: Negative for blurred vision.   Cardiovascular: Negative for chest pain and palpitations.   Respiratory: Negative for shortness of breath and snoring.    Endocrine: Negative for cold intolerance and heat intolerance.   Hematologic/Lymphatic: Negative for adenopathy. Does not bruise/bleed easily.   Skin: Negative for itching, nail changes and skin cancer.   Musculoskeletal: Negative for arthritis and myalgias.   Gastrointestinal: Negative for abdominal pain, dysphagia and heartburn.   Genitourinary: Negative for bladder incontinence and frequency.   Neurological: Positive for headaches. Negative for dizziness, light-headedness, seizures and vertigo.   Psychiatric/Behavioral: Negative for altered mental status.   Allergic/Immunologic: Negative for environmental allergies and hives.       Diabetes- No  Thyroid- abnormal    Objective     /82 (BP Location: Left arm)   Pulse 72   Temp 97.1 °F (36.2 °C)   Ht 172.7 cm (67.99\")   Wt 97 kg (213 lb 12.8 oz)   BMI 32.52 kg/m²     Vitals and nursing note reviewed.   Constitutional:       Appearance: Healthy appearance. Not in distress.   Eyes:      Conjunctiva/sclera: Conjunctivae normal.      Pupils: Pupils are equal, round, and reactive to light.   HENT:      Head: Normocephalic.   Pulmonary:      Effort: Pulmonary effort is normal.      Breath sounds: Normal breath sounds.   Cardiovascular:      PMI at left midclavicular line. Normal rate. Regular rhythm.      Murmurs: There is a diastolic murmur.   Abdominal:      General: Bowel sounds are normal.      Palpations: Abdomen is soft.   Musculoskeletal: Normal range of motion.      Cervical back: Normal range of motion and neck supple. Skin:     General: Skin is warm.   Neurological:      Mental Status: Alert, oriented to person, place, and time and oriented to person, place and time.       Procedures            Assessment/Plan   Patient's Body mass index is 32.52 " kg/m². BMI is above normal parameters. Recommendations include: nutrition counseling.     Diagnoses and all orders for this visit:    1. Essential hypertension (Primary)  -     losartan (COZAAR) 100 MG tablet; Take 1 tablet by mouth Daily.  Dispense: 90 tablet; Refill: 4  -     carvedilol (COREG) 12.5 MG tablet; Take 1 tablet by mouth 2 (Two) Times a Day.  Dispense: 180 tablet; Refill: 4    2. Hypercholesteremia    3. Aortic valve insufficiency, etiology of cardiac valve disease unspecified    4. Murmur, cardiac    5. Metabolic syndrome    6. Sarcoidosis of lung (CMS/HCC)    7. PHT (pulmonary hypertension) (CMS/HCC)    8. Hypothyroidism, unspecified type       At baseline her heart rate is stable.  Her blood pressure doing much better now.  Her BMI is still around 33.  Her clinical examination is unremarkable other than a slightly loud second heart sound and early diastolic murmur at the aortic area.    Regarding the hypertension, she is doing very well with higher dose of Cozaar and Coreg.  At this time continue the same.  I did talk to her about getting labs done.  She apparently has very high copayment and she is trying to avoid doing any labs still they start doing it again at [facility] during the fall break.    Regarding her hypercholesterolemia, at this time need to manage only with diet    Regarding her aortic insufficiency, it seems to be mild to moderate by clinical examination we will continue to monitor    Regarding the metabolic syndrome, I talked to her again about diet and weight reduction.  I talked to her about low-carb diet    Regarding the pulmonary hypertension, part of them could be related to her sarcoidosis but at this time she is asymptomatic we will continue to monitor    Regarding her hypothyroidism, she is being managed by her gynecologist.  She is advised to talk to them about TSH level    Overall cardiac status appears stable.  I will see her back in 1 year or sooner if  needed.                  Electronically signed by Ja Bojorquez MD April 1, 2021 16:43 EDT

## 2021-04-22 ENCOUNTER — TELEPHONE (OUTPATIENT)
Dept: CARDIOLOGY | Facility: CLINIC | Age: 61
End: 2021-04-22

## 2021-04-22 NOTE — TELEPHONE ENCOUNTER
Pt asking for letter stating 190 lbs is a healthy goal weight. Needing it for her weight loss program [employer].

## 2021-04-23 NOTE — TELEPHONE ENCOUNTER
Pt aware that over goal should be 180lbs. Understanding voiced, she would like to start with a goal of 190lbs.

## 2022-03-04 DIAGNOSIS — I10 ESSENTIAL HYPERTENSION: ICD-10-CM

## 2022-03-04 RX ORDER — LOSARTAN POTASSIUM 100 MG/1
100 TABLET ORAL DAILY
Qty: 90 TABLET | Refills: 3 | Status: SHIPPED | OUTPATIENT
Start: 2022-03-04 | End: 2022-05-03 | Stop reason: SDUPTHER

## 2022-04-13 DIAGNOSIS — I10 ESSENTIAL HYPERTENSION: ICD-10-CM

## 2022-04-13 RX ORDER — CARVEDILOL 12.5 MG/1
TABLET ORAL
Qty: 180 TABLET | Refills: 0 | Status: SHIPPED | OUTPATIENT
Start: 2022-04-13 | End: 2022-05-03 | Stop reason: SDUPTHER

## 2022-05-03 ENCOUNTER — OFFICE VISIT (OUTPATIENT)
Dept: CARDIOLOGY | Facility: CLINIC | Age: 62
End: 2022-05-03

## 2022-05-03 VITALS
SYSTOLIC BLOOD PRESSURE: 170 MMHG | WEIGHT: 197 LBS | HEIGHT: 68 IN | BODY MASS INDEX: 29.86 KG/M2 | HEART RATE: 72 BPM | DIASTOLIC BLOOD PRESSURE: 110 MMHG

## 2022-05-03 DIAGNOSIS — E78.00 HYPERCHOLESTEREMIA: ICD-10-CM

## 2022-05-03 DIAGNOSIS — E88.81 METABOLIC SYNDROME: ICD-10-CM

## 2022-05-03 DIAGNOSIS — I35.1 NONRHEUMATIC AORTIC VALVE INSUFFICIENCY: ICD-10-CM

## 2022-05-03 DIAGNOSIS — E03.9 HYPOTHYROIDISM, UNSPECIFIED TYPE: ICD-10-CM

## 2022-05-03 DIAGNOSIS — E55.9 VITAMIN D DEFICIENCY: ICD-10-CM

## 2022-05-03 DIAGNOSIS — I10 ESSENTIAL HYPERTENSION: Primary | ICD-10-CM

## 2022-05-03 PROCEDURE — 99214 OFFICE O/P EST MOD 30 MIN: CPT | Performed by: INTERNAL MEDICINE

## 2022-05-03 RX ORDER — LOSARTAN POTASSIUM 100 MG/1
100 TABLET ORAL DAILY
Qty: 90 TABLET | Refills: 4 | Status: SHIPPED | OUTPATIENT
Start: 2022-05-03

## 2022-05-03 RX ORDER — SPIRONOLACTONE 25 MG/1
25 TABLET ORAL DAILY
Qty: 90 TABLET | Refills: 4 | Status: SHIPPED | OUTPATIENT
Start: 2022-05-03 | End: 2022-05-05

## 2022-05-03 RX ORDER — CARVEDILOL 12.5 MG/1
12.5 TABLET ORAL 2 TIMES DAILY
Qty: 180 TABLET | Refills: 4 | Status: SHIPPED | OUTPATIENT
Start: 2022-05-03

## 2022-05-03 NOTE — PROGRESS NOTES
Chief Complaint   Patient presents with   • Follow-up     For cardiac management, doing well     • Med Refill     Refills needed on losartan and Coreg, 90 days to [facility] in [place].    • Labs     Most recent lipids in chart from July, pt brought labs from [facility] from March, will be in door.    • Weight Loss     Has lost about 20 pounds, doing [employer], same thing as weight watchers but cheaper.    • Aspirin     Does not take a daily aspirin due to nosebleeds and being on celebrex.    • Hypertension     BP elevated, does eat pretzels every day to help with weight loss. Very high in sodium.        CARDIAC COMPLAINTS  Cardiac Management        Subjective   Yessenia Baxter is a 62 y.o. female came in today for her follow-up visit.  She has history of hypertension, hypercholesterolemia who also has moderate aortic insufficiency.  She came today with no new symptoms.  She has gone on diet  Called [facility] and lost about 20 pounds in the last 1 year.  Apparently one of the things to be done is eating pretzels every day.  It did help with the weight loss but unfortunately it is high in sodium and the blood pressure has been going up.  She did bring copy of her labs which was done in March.  Her renal function and the potassium level was normal.  Her blood count was normal.  Her cholesterol which was checked in July was elevated but she is not able to tolerate any statin.  Her total cholesterol is 221 with the LDL of 141.  She denies having any chest pain.  She denies having any shortness of breath.  She denies having any edema.              Cardiac History  Past Surgical History:   Procedure Laterality Date   • CARDIOVASCULAR STRESS TEST  09/16/2015    R. Stress- ([facility]) 6 Min, 90% THR. BP- 204/100. Few PVC's. Negative    • CARDIOVASCULAR STRESS TEST  11/28/2016    @[facility]. 6 Min, 89%THR. BP- 210/106. Negative   • ECHO - CONVERTED  09/16/2015     Echo- ([facility]) EF 60%. Mod AI. RVSP- 50 mmHg    • ECHO - CONVERTED  11/28/2016    @[facility].  EF 65%. Mild- Mod AI. RVSP-51 mmHg   • ECHO - CONVERTED  11/21/2017    @[facility]. EF 65%. Mild- Mod AI. Mild MR. RVSP- 54 mmHg       Current Outpatient Medications   Medication Sig Dispense Refill   • Acetaminophen (TYLENOL ARTHRITIS PAIN PO) Take  by mouth.     • BLACK COHOSH PO Take  by mouth.     • carvedilol (COREG) 12.5 MG tablet Take 1 tablet by mouth 2 (Two) Times a Day. 180 tablet 4   • celecoxib (CeleBREX) 200 MG capsule Take 200 mg by mouth Daily.     • Cyanocobalamin (VITAMIN B-12 IJ) Inject  as directed Every 30 (Thirty) Days.     • levothyroxine (SYNTHROID, LEVOTHROID) 112 MCG tablet Take 112 mcg by mouth Daily.     • losartan (COZAAR) 100 MG tablet Take 1 tablet by mouth Daily. 90 tablet 4   • Multiple Vitamin (MULTI VITAMIN PO) Take  by mouth Daily.     • Probiotic Product (SOLUBLE FIBER/PROBIOTICS PO) Take  by mouth.     • VITAMIN A PO Take  by mouth.     • vitamin D (ERGOCALCIFEROL) 95000 UNITS capsule capsule Take 50,000 Units by mouth Every 7 (Seven) Days.     • spironolactone (ALDACTONE) 25 MG tablet Take 1 tablet by mouth Daily. 90 tablet 4     No current facility-administered medications for this visit.       Allergies  :  Aspirin, Demerol [meperidine], Latex, Other, Penicillins, Simvastatin, Tetracyclines & related, and Crestor [rosuvastatin calcium]       Past Medical History:   Diagnosis Date   • Arthritis    • Cyst removed from right hand    • Esophageal reflux    • Fibromyalgia    • H/O shoulder surgery     right   • H/O: hysterectomy    • History of cholecystectomy    • Hypercholesterolemia    • Hypertension    • Hypothyroidism    • Left knee surgery twice    • Sarcoidosis        Social History     Socioeconomic History   • Marital status:    Tobacco Use   • Smoking status: Never Smoker   • Smokeless tobacco: Never Used   Vaping Use   • Vaping Use: Never used   Substance and Sexual Activity   • Alcohol use: No   • Drug use: No       Family History   Problem Relation Age of Onset   •  "Stroke Mother 76   • Other Mother         History of A Fib.   • Heart attack Father    • Heart failure Father    • Other Other          one at age 43 had AVR. one had stents at 67,        Review of Systems   Constitutional: Positive for weight loss. Negative for decreased appetite and malaise/fatigue.   HENT: Negative for congestion and sore throat.    Eyes: Negative for blurred vision.   Cardiovascular: Negative for chest pain, leg swelling and palpitations.   Respiratory: Negative for shortness of breath and snoring.    Endocrine: Negative for cold intolerance and heat intolerance.   Hematologic/Lymphatic: Negative for adenopathy. Does not bruise/bleed easily.   Skin: Negative for itching, nail changes and skin cancer.   Musculoskeletal: Negative for arthritis and myalgias.   Gastrointestinal: Negative for abdominal pain, dysphagia and heartburn.   Genitourinary: Negative for bladder incontinence and frequency.   Neurological: Negative for dizziness, light-headedness, seizures and vertigo.   Psychiatric/Behavioral: Negative for altered mental status.   Allergic/Immunologic: Negative for environmental allergies and hives.       Diabetes- No  Thyroid- abnormal    Objective     BP (!) 170/110   Pulse 72   Ht 172.7 cm (68\")   Wt 89.4 kg (197 lb)   BMI 29.95 kg/m²     Vitals and nursing note reviewed.   Constitutional:       Appearance: Healthy appearance. Not in distress.   Eyes:      Conjunctiva/sclera: Conjunctivae normal.      Pupils: Pupils are equal, round, and reactive to light.   HENT:      Head: Normocephalic.   Pulmonary:      Effort: Pulmonary effort is normal.      Breath sounds: Normal breath sounds.   Cardiovascular:      PMI at left midclavicular line. Normal rate. Regular rhythm.      Murmurs: There is a systolic murmur. There is a diastolic murmur.   Abdominal:      General: Bowel sounds are normal.      Palpations: Abdomen is soft.   Musculoskeletal: Normal range of motion.      Cervical back: " Normal range of motion and neck supple. Skin:     General: Skin is warm and dry.   Neurological:      Mental Status: Alert, oriented to person, place, and time and oriented to person, place and time.       Procedures            Assessment/Plan   BMI is >= 25 and < 30. (Overweight) The following options were offered after discussion: exercise counseling/recommendations and nutrition counseling/recommendations     Diagnoses and all orders for this visit:    1. Essential hypertension (Primary)  -     spironolactone (ALDACTONE) 25 MG tablet; Take 1 tablet by mouth Daily.  Dispense: 90 tablet; Refill: 4  -     carvedilol (COREG) 12.5 MG tablet; Take 1 tablet by mouth 2 (Two) Times a Day.  Dispense: 180 tablet; Refill: 4  -     losartan (COZAAR) 100 MG tablet; Take 1 tablet by mouth Daily.  Dispense: 90 tablet; Refill: 4    2. Hypercholesteremia    3. Nonrheumatic aortic valve insufficiency  -     spironolactone (ALDACTONE) 25 MG tablet; Take 1 tablet by mouth Daily.  Dispense: 90 tablet; Refill: 4    4. Hypothyroidism, unspecified type    5. Metabolic syndrome    6. Vitamin D deficiency       At baseline her heart rate is stable.  Her blood pressure is moderately elevated.  Her BMI is still around 30.  She does have soft systolic murmur at the mitral area and early diastolic murmur at the aortic area.  She has no leg edema.    Regarding her hypertension, it is still elevated and this could be related to her sodium content.  Though ideally I like to start her on hydrochlorothiazide, she did have problems taking it in the past.  I started her on Aldactone at 25 mg once a day.  She is advised to check her potassium and renal function in the next few months.  She has an appointment to see her gynecologist and gets labs done.  Meanwhile continue Cozaar and Coreg    Regarding her hypercholesterolemia, she is not on any statin.  She is advised to recheck her cholesterol during the fall festival    Regarding her attic  insufficiency, it appears to be little bit more prominent.  She needs to undergo a repeat echocardiogram.  She wants to wait at least 1 more year when she gets her Medicare before undergoing the test.    Regarding her hypothyroidism, I do not have any thyroid function test.  She is advised to talk to you about it    Regarding the metabolic syndrome, as though she has lost some weight she is still overweight now.  I talked to her about cutting down on the sodium content    Regarding her history of vitamin D deficiency that needs to be checked also    Overall cardiac status appears stable.  I will see her back in 1 year or sooner if needed.                  Electronically signed by Ja Bojorquez MD May 3, 2022 14:31 EDT

## 2022-05-05 ENCOUNTER — TELEPHONE (OUTPATIENT)
Dept: CARDIOLOGY | Facility: CLINIC | Age: 62
End: 2022-05-05

## 2022-05-05 RX ORDER — FUROSEMIDE 20 MG/1
20 TABLET ORAL DAILY
Qty: 30 TABLET | Refills: 11 | Status: SHIPPED | OUTPATIENT
Start: 2022-05-05

## 2022-05-05 NOTE — TELEPHONE ENCOUNTER
Patient called states she had allergic reaction to the Spironolactone.  Within aboutn 3 hours of taking the first tablet she had severe abdominal cramping, diarrhea, tongue thickness, and feet and legs turned a dark pink.  I added to allergy list.    She is asking about a low dose of Lasix that you all discussed at office visit.

## 2022-09-26 NOTE — PROGRESS NOTES
Letter of Medical Necessity and Lipid panel faxed to [facility]  
Patient has been approved for Praluent. Patient is aware  
Praluent 150 mg approved 11/01/2017 through 11/15/2018  
No

## 2023-03-02 ENCOUNTER — TELEPHONE (OUTPATIENT)
Dept: CARDIOLOGY | Facility: CLINIC | Age: 63
End: 2023-03-02
Payer: MEDICARE

## 2023-03-02 RX ORDER — EZETIMIBE 10 MG/1
10 TABLET ORAL DAILY
Qty: 90 TABLET | Refills: 3 | Status: SHIPPED | OUTPATIENT
Start: 2023-03-02

## 2023-03-02 NOTE — TELEPHONE ENCOUNTER
Patient left a VM for you stating she talked to her insurance and that she has to try Zetia first.  She said her pharmacy is [facility].    She left on VM that if you need to speak to her, [phone].

## 2023-03-02 NOTE — TELEPHONE ENCOUNTER
Pt will need to try Zetia before her insurance will approve a PCSK9. Do you want me to send script?

## 2023-03-14 ENCOUNTER — TELEPHONE (OUTPATIENT)
Dept: CARDIOLOGY | Facility: CLINIC | Age: 63
End: 2023-03-14
Payer: MEDICARE

## 2023-03-14 DIAGNOSIS — R01.1 MURMUR, CARDIAC: ICD-10-CM

## 2023-03-14 DIAGNOSIS — I35.1 NONRHEUMATIC AORTIC VALVE INSUFFICIENCY: Primary | ICD-10-CM

## 2023-03-14 NOTE — TELEPHONE ENCOUNTER
----- Message from Ja Bojorquez MD sent at 3/13/2023  5:18 PM EDT -----  Regarding: FW: Pulmonology visit today  Contact: [phone]        ----- Message -----  From: Bea Fonseca RN  Sent: 3/13/2023   4:47 PM EDT  To: Ja Bojorquez MD  Subject: FW: Pulmonology visit today                        ----- Message -----  From: Yessenia Baxter  Sent: 3/13/2023   4:03 PM EDT  To: [facility]  Subject: Pulmonology visit today                          I went to Dr. Tim today for my annual pulmonology appt. She was concerned that I had Covid at the end of July and I have been getting short on air plus fatigue. My chest x-rays were good today. O2 sat was 93 at the lowest up to 98% after doing a walking test. She has ordered PFT for 04/14/23. She is concerned due to my having had Covid that the problem could be cardiac instead of pulmonary. She asked if I could contact your office and see if I could possibly be seen before 05/02/23? Thanks.

## 2023-03-14 NOTE — TELEPHONE ENCOUNTER
Pt aware of Dr Bojorquez's recommendations to have an echo. She would like to proceed. She is aware to be expecting a call from scheduling.  Please add order for our diagnostic center.

## 2023-03-29 ENCOUNTER — HOSPITAL ENCOUNTER (OUTPATIENT)
Dept: CARDIOLOGY | Facility: HOSPITAL | Age: 63
Discharge: HOME | End: 2023-03-29
Admitting: INTERNAL MEDICINE
Payer: MEDICARE

## 2023-03-29 DIAGNOSIS — R01.1 MURMUR, CARDIAC: ICD-10-CM

## 2023-03-29 DIAGNOSIS — I35.1 NONRHEUMATIC AORTIC VALVE INSUFFICIENCY: ICD-10-CM

## 2023-03-29 LAB
AORTIC DIMENSIONLESS INDEX: 0.8 (DI)
BH CV ECHO MEAS - ACS: 1.77 CM
BH CV ECHO MEAS - AI P1/2T: 755.3 MSEC
BH CV ECHO MEAS - AO MAX PG: 8.1 MMHG
BH CV ECHO MEAS - AO MEAN PG: 4 MMHG
BH CV ECHO MEAS - AO ROOT DIAM: 3.1 CM
BH CV ECHO MEAS - AO V2 MAX: 142 CM/SEC
BH CV ECHO MEAS - AO V2 VTI: 31.4 CM
BH CV ECHO MEAS - AVA(I,D): 2.9 CM2
BH CV ECHO MEAS - EDV(CUBED): 137.7 ML
BH CV ECHO MEAS - EDV(MOD-SP4): 119 ML
BH CV ECHO MEAS - EF(MOD-SP4): 61.4 %
BH CV ECHO MEAS - EF_3D-VOL: 42 %
BH CV ECHO MEAS - ESV(CUBED): 37.5 ML
BH CV ECHO MEAS - ESV(MOD-SP4): 45.9 ML
BH CV ECHO MEAS - FS: 35.2 %
BH CV ECHO MEAS - IVS/LVPW: 1.12 CM
BH CV ECHO MEAS - IVSD: 1.5 CM
BH CV ECHO MEAS - LA A2CS (ATRIAL LENGTH): 57 CM
BH CV ECHO MEAS - LA A4C LENGTH: 4.7 CM
BH CV ECHO MEAS - LA DIMENSION: 3.5 CM
BH CV ECHO MEAS - LAT PEAK E' VEL: 6.8 CM/SEC
BH CV ECHO MEAS - LV DIASTOLIC VOL/BSA (35-75): 58.6 CM2
BH CV ECHO MEAS - LV MASS(C)D: 312.5 GRAMS
BH CV ECHO MEAS - LV MAX PG: 4.6 MMHG
BH CV ECHO MEAS - LV MEAN PG: 2.7 MMHG
BH CV ECHO MEAS - LV SYSTOLIC VOL/BSA (12-30): 22.6 CM2
BH CV ECHO MEAS - LV V1 MAX: 107.7 CM/SEC
BH CV ECHO MEAS - LV V1 VTI: 29.4 CM
BH CV ECHO MEAS - LVIDD: 5.2 CM
BH CV ECHO MEAS - LVIDS: 3.3 CM
BH CV ECHO MEAS - LVOT AREA: 3.1 CM2
BH CV ECHO MEAS - LVOT DIAM: 1.99 CM
BH CV ECHO MEAS - LVPWD: 1.34 CM
BH CV ECHO MEAS - MED PEAK E' VEL: 5.3 CM/SEC
BH CV ECHO MEAS - MV A MAX VEL: 75.3 CM/SEC
BH CV ECHO MEAS - MV DEC SLOPE: 416.1 CM/SEC2
BH CV ECHO MEAS - MV DEC TIME: 0.18 MSEC
BH CV ECHO MEAS - MV E MAX VEL: 90.4 CM/SEC
BH CV ECHO MEAS - MV E/A: 1.2
BH CV ECHO MEAS - MV MAX PG: 3 MMHG
BH CV ECHO MEAS - MV MEAN PG: 1.06 MMHG
BH CV ECHO MEAS - MV P1/2T: 57.1 MSEC
BH CV ECHO MEAS - MV V2 VTI: 27.5 CM
BH CV ECHO MEAS - MVA(P1/2T): 3.9 CM2
BH CV ECHO MEAS - MVA(VTI): 3.3 CM2
BH CV ECHO MEAS - PA V2 MAX: 82.1 CM/SEC
BH CV ECHO MEAS - RAP SYSTOLE: 10 MMHG
BH CV ECHO MEAS - RV MAX PG: 1.23 MMHG
BH CV ECHO MEAS - RV V1 MAX: 55.5 CM/SEC
BH CV ECHO MEAS - RV V1 VTI: 15.7 CM
BH CV ECHO MEAS - RVDD: 3.9 CM
BH CV ECHO MEAS - RVSP: 41 MMHG
BH CV ECHO MEAS - SI(MOD-SP4): 36 ML/M2
BH CV ECHO MEAS - SV(LVOT): 91.6 ML
BH CV ECHO MEAS - SV(MOD-SP4): 73.1 ML
BH CV ECHO MEAS - TR MAX PG: 31 MMHG
BH CV ECHO MEAS - TR MAX VEL: 278.2 CM/SEC
BH CV ECHO MEASUREMENTS AVERAGE E/E' RATIO: 14.94
LEFT ATRIUM VOLUME INDEX: 29.5 ML/M2
LEFT ATRIUM VOLUME: 59 ML
MAXIMAL PREDICTED HEART RATE: 155 BPM
STRESS TARGET HR: 132 BPM

## 2023-03-29 PROCEDURE — 93306 TTE W/DOPPLER COMPLETE: CPT | Performed by: INTERNAL MEDICINE

## 2023-03-29 PROCEDURE — 93306 TTE W/DOPPLER COMPLETE: CPT

## 2023-04-12 DIAGNOSIS — I10 ESSENTIAL HYPERTENSION: ICD-10-CM

## 2023-04-12 RX ORDER — LOSARTAN POTASSIUM 100 MG/1
100 TABLET ORAL DAILY
Qty: 90 TABLET | Refills: 3 | Status: SHIPPED | OUTPATIENT
Start: 2023-04-12

## 2023-05-02 ENCOUNTER — OFFICE VISIT (OUTPATIENT)
Dept: CARDIOLOGY | Facility: CLINIC | Age: 63
End: 2023-05-02
Payer: MEDICARE

## 2023-05-02 VITALS
SYSTOLIC BLOOD PRESSURE: 176 MMHG | HEART RATE: 64 BPM | DIASTOLIC BLOOD PRESSURE: 110 MMHG | HEIGHT: 68 IN | WEIGHT: 202 LBS | BODY MASS INDEX: 30.62 KG/M2

## 2023-05-02 DIAGNOSIS — E78.00 HYPERCHOLESTEREMIA: ICD-10-CM

## 2023-05-02 DIAGNOSIS — E03.9 HYPOTHYROIDISM, UNSPECIFIED TYPE: ICD-10-CM

## 2023-05-02 DIAGNOSIS — E88.81 METABOLIC SYNDROME: ICD-10-CM

## 2023-05-02 DIAGNOSIS — I27.20 PHT (PULMONARY HYPERTENSION): ICD-10-CM

## 2023-05-02 DIAGNOSIS — R60.0 BILATERAL LEG EDEMA: ICD-10-CM

## 2023-05-02 DIAGNOSIS — G47.30 SLEEP APNEA IN ADULT: ICD-10-CM

## 2023-05-02 DIAGNOSIS — M35.9 CONNECTIVE TISSUE DISORDER: ICD-10-CM

## 2023-05-02 DIAGNOSIS — I10 ESSENTIAL HYPERTENSION: Primary | ICD-10-CM

## 2023-05-02 PROCEDURE — 3080F DIAST BP >= 90 MM HG: CPT | Performed by: INTERNAL MEDICINE

## 2023-05-02 PROCEDURE — 99214 OFFICE O/P EST MOD 30 MIN: CPT | Performed by: INTERNAL MEDICINE

## 2023-05-02 PROCEDURE — 1160F RVW MEDS BY RX/DR IN RCRD: CPT | Performed by: INTERNAL MEDICINE

## 2023-05-02 PROCEDURE — 3077F SYST BP >= 140 MM HG: CPT | Performed by: INTERNAL MEDICINE

## 2023-05-02 PROCEDURE — 1159F MED LIST DOCD IN RCRD: CPT | Performed by: INTERNAL MEDICINE

## 2023-05-02 RX ORDER — PREDNISONE 10 MG/1
TABLET ORAL
COMMUNITY
Start: 2023-04-27

## 2023-05-02 RX ORDER — GABAPENTIN 100 MG/1
1 CAPSULE ORAL 2 TIMES DAILY PRN
COMMUNITY
Start: 2023-02-16

## 2023-05-02 RX ORDER — CARVEDILOL 12.5 MG/1
12.5 TABLET ORAL 2 TIMES DAILY
Qty: 180 TABLET | Refills: 4 | Status: SHIPPED | OUTPATIENT
Start: 2023-05-02

## 2023-05-02 RX ORDER — FUROSEMIDE 20 MG/1
20 TABLET ORAL AS NEEDED
Qty: 30 TABLET | Refills: 11 | Status: SHIPPED | OUTPATIENT
Start: 2023-05-02

## 2023-05-02 RX ORDER — NIFEDIPINE 30 MG/1
30 TABLET, EXTENDED RELEASE ORAL DAILY
Qty: 30 TABLET | Refills: 6 | Status: SHIPPED | OUTPATIENT
Start: 2023-05-02

## 2023-05-02 RX ORDER — LOSARTAN POTASSIUM 100 MG/1
100 TABLET ORAL DAILY
Qty: 90 TABLET | Refills: 3 | Status: SHIPPED | OUTPATIENT
Start: 2023-05-02

## 2023-05-02 NOTE — LETTER
May 2, 2023       No Recipients    Patient: Yessenia Baxter   YOB: 1960   Date of Visit: 5/2/2023       Dear Dr. Landon Recipients:    Thank you for referring Yessenia Baxter to me for evaluation. Below are the relevant portions of my assessment and plan of care.    If you have questions, please do not hesitate to call me. I look forward to following Yessenia along with you.         Sincerely,        Ja Bojorquez MD        CC:   No Recipients    Ja Bojorquez MD  05/02/23 1432  Sign when Signing Visit  Chief Complaint   Patient presents with   • Follow-up     For cardiac management, still having some SOB / fatigue but seems to be improving. Feels like it is all related to having Covid July 2022.    • Shortness of Breath     Recent echo completed. Still waiting to have PFT and CT with her Dr Tim.    • Labs     Most recent labs from Feb in chart. Pt had to try Zetia first before going to a PCSk9, pt was unable to the Zetia due to myalgia. PCSK9's going to cost over $1600.00 for her part.    • Med Refill     Refills needed on cardiac meds. 90 days to [facility].        CARDIAC COMPLAINTS  dyspnea and fatigue       Subjective   Yessenia Baxter is a 63 y.o. female came in today for her regular follow-up visit.  She has history of hypertension, hypercholesterolemia for which she could not tolerate most of the medications.  She also has history of connective tissue problem for which she sees rheumatologist.  In December she was trying to back up most of the [place] products and developed pain in her legs and hip.  Was told that she has hamstring tendinitis and is being treated with steroids.  She also developed COVID infection after which she started getting increasing shortness of breath.  She called our office regarding that so we did an echocardiogram and found the LV function is still normal.  She does have moderate AI.  Her PA pressure did come down a little bit.  She also had lab work  done and found the LDL was significantly elevated.  We tried Zetia since she could not take any statin.  After about 3 weeks of Zetia she started developing severe myalgia unable to walk.  Her blood pressure also has been significantly elevated for which she was tried Aldactone but developed reaction to that.  We tried to put her on PCSK9 inhibitor but turned out her copayment is more than thousand dollars.  She came today feeling depressed.  Still having shortness of breath still having muscle ache.  She has been followed by a pulmonologist regarding her lungs.  She is due to have a PFT done and a CT done.              Cardiac History  Past Surgical History:   Procedure Laterality Date   • CARDIOVASCULAR STRESS TEST  09/16/2015    R. Stress- (RS) 6 Min, 90% THR. BP- 204/100. Few PVC's. Negative    • CARDIOVASCULAR STRESS TEST  11/28/2016    @RSH. 6 Min, 89%THR. BP- 210/106. Negative   • ECHO - CONVERTED  09/16/2015     Echo- (RS) EF 60%. Mod AI. RVSP- 50 mmHg    • ECHO - CONVERTED  11/28/2016    @RSH. EF 65%. Mild- Mod AI. RVSP-51 mmHg   • ECHO - CONVERTED  11/21/2017    @RSH. EF 65%. Mild- Mod AI. Mild MR. RVSP- 54 mmHg   • ECHO - CONVERTED  03/29/2023    EF 65%.Mild-Mod AI, Mild MR. RVSP- 41 mmHg       Current Outpatient Medications   Medication Sig Dispense Refill   • Acetaminophen (TYLENOL ARTHRITIS PAIN PO) Take  by mouth.     • BLACK COHOSH PO Take  by mouth.     • carvedilol (COREG) 12.5 MG tablet Take 1 tablet by mouth 2 (Two) Times a Day. 180 tablet 4   • celecoxib (CeleBREX) 200 MG capsule Take 1 capsule by mouth Daily.     • Cyanocobalamin (VITAMIN B-12 IJ) Inject  as directed Every 30 (Thirty) Days.     • furosemide (LASIX) 20 MG tablet Take 1 tablet by mouth As Needed (Leg edema). 30 tablet 11   • gabapentin (NEURONTIN) 100 MG capsule Take 1 capsule by mouth 2 (Two) Times a Day As Needed.     • levothyroxine (SYNTHROID, LEVOTHROID) 112 MCG tablet Take 1 tablet by mouth Daily.     • losartan (COZAAR)  100 MG tablet Take 1 tablet by mouth Daily. 90 tablet 3   • Multiple Vitamin (MULTI VITAMIN PO) Take  by mouth Daily.     • predniSONE (DELTASONE) 10 MG tablet TAKE 4 TABLETS BY MOUTH ONCE DAILY FOR 4 DAYS THEN TAKE 3 TABLETS ONCE DAILY FOR 4 DAYS THEN TAKE 2 TABLETS ONCE DAILY FOR 4 DAYS THEN TAKE 1 TABLET ONCE DAILY FOR 4 DAYS     • Probiotic Product (SOLUBLE FIBER/PROBIOTICS PO) Take  by mouth.     • VITAMIN A PO Take  by mouth.     • vitamin D (ERGOCALCIFEROL) 64782 UNITS capsule capsule Take 1 capsule by mouth Every 7 (Seven) Days.     • NIFEdipine XL (Procardia XL) 30 MG 24 hr tablet Take 1 tablet by mouth Daily. 30 tablet 6     No current facility-administered medications for this visit.       Allergies  :  Spironolactone, Aspirin, Zetia [ezetimibe], Demerol [meperidine], Latex, Other, Penicillins, Simvastatin, Tetracyclines & related, Crestor [rosuvastatin calcium], and Lipitor [atorvastatin]       Past Medical History:   Diagnosis Date   • Arthritis    • Cyst removed from right hand    • Esophageal reflux    • Fibromyalgia    • H/O shoulder surgery     right   • H/O: hysterectomy    • History of cholecystectomy    • Hypercholesterolemia    • Hypertension    • Hypothyroidism    • Left knee surgery twice    • Lupus     Followed rheumatologist   • RA (rheumatoid arthritis)     Bilateral Hip   • Sarcoidosis        Social History     Socioeconomic History   • Marital status:    Tobacco Use   • Smoking status: Never   • Smokeless tobacco: Never   Vaping Use   • Vaping Use: Never used   Substance and Sexual Activity   • Alcohol use: No   • Drug use: No       Family History   Problem Relation Age of Onset   • Stroke Mother 76   • Other Mother         History of A Fib.   • Heart attack Father    • Heart failure Father    • Other Other          one at age 43 had AVR. one had stents at 67,        Review of Systems   Constitutional: Positive for malaise/fatigue. Negative for decreased appetite.   HENT: Negative  "for congestion and sore throat.    Eyes: Negative for blurred vision, double vision and visual disturbance.   Cardiovascular: Positive for dyspnea on exertion. Negative for chest pain.   Respiratory: Positive for shortness of breath. Negative for snoring.    Endocrine: Negative for cold intolerance and heat intolerance.   Hematologic/Lymphatic: Negative for adenopathy. Does not bruise/bleed easily.   Skin: Negative for itching, nail changes and skin cancer.   Musculoskeletal: Positive for joint pain and myalgias. Negative for arthritis.   Gastrointestinal: Negative for abdominal pain, dysphagia and heartburn.   Genitourinary: Negative for bladder incontinence and frequency.   Neurological: Negative for dizziness, seizures and vertigo.   Psychiatric/Behavioral: Negative for altered mental status.   Allergic/Immunologic: Negative for environmental allergies and hives.       Diabetes- No  Thyroid- abnormal    Objective     BP (!) 176/110   Pulse 64   Ht 172.7 cm (68\")   Wt 91.6 kg (202 lb)   BMI 30.71 kg/m²     Vitals and nursing note reviewed.   Constitutional:       Appearance: Healthy appearance. Not in distress.   Eyes:      Conjunctiva/sclera: Conjunctivae normal.      Pupils: Pupils are equal, round, and reactive to light.   HENT:      Head: Normocephalic.   Pulmonary:      Effort: Pulmonary effort is normal.      Breath sounds: Normal breath sounds.   Cardiovascular:      PMI at left midclavicular line. Normal rate. Regular rhythm. loud S2.      Murmurs: There is a grade 3/4 high frequency blowing decrescendo, early diastolic murmur at the URSB, radiating to the apex.   Abdominal:      General: Bowel sounds are normal.      Palpations: Abdomen is soft.   Musculoskeletal: Normal range of motion.      Cervical back: Normal range of motion and neck supple. Skin:     General: Skin is warm and dry.   Neurological:      Mental Status: Alert, oriented to person, place, and time and oriented to person, place and " time.       Procedures           @ASSESSMENT/PLAN@  BMI is >= 30 and <35. (Class 1 Obesity). The following options were offered after discussion;: nutrition counseling/recommendations     Diagnoses and all orders for this visit:    1. Essential hypertension (Primary)  -     losartan (COZAAR) 100 MG tablet; Take 1 tablet by mouth Daily.  Dispense: 90 tablet; Refill: 3  -     carvedilol (COREG) 12.5 MG tablet; Take 1 tablet by mouth 2 (Two) Times a Day.  Dispense: 180 tablet; Refill: 4  -     NIFEdipine XL (Procardia XL) 30 MG 24 hr tablet; Take 1 tablet by mouth Daily.  Dispense: 30 tablet; Refill: 6    2. Hypercholesteremia    3. Metabolic syndrome    4. Hypothyroidism, unspecified type    5. PHT (pulmonary hypertension)    6. Sleep apnea in adult    7. Bilateral leg edema  -     furosemide (LASIX) 20 MG tablet; Take 1 tablet by mouth As Needed (Leg edema).  Dispense: 30 tablet; Refill: 11    8. Connective tissue disorder    At baseline her heart rate is lower limit of normal.  Her blood pressure is moderately elevated.  Her clinical examination reveals a BMI of 31.  She does have slightly loud second heart sound and early diastolic murmur at the aortic area.    Regarding her hypertension, it is still significantly elevated.  She is already on full dose of Cozaar and tolerable dose of Coreg.  She is not able to take hydrochlorothiazide or Aldactone.  I talked to her about the possible side effect of Procardia XL and gave her a prescription for 30 mg once a day.  She is advised to check her blood pressure every day and call us back if the systolic is still persistently elevated    Regarding her hypercholesterolemia, she has tried all the statin, she has tried Praluent, she has tried Zetia and unable to tolerate any of them.  I am going to see whether we can try Inclisiran (Leqvio).  This has lesser side effects and hopefully it is covered by her insurance or at least affordable    Regarding metabolic syndrome, talked  to her again about diet and weight reduction.  I talked to her about cutting down on the carbohydrate    Regarding her hypothyroidism, she is on thyroid supplements and the last TSH level was normal    Regarding her pulmonary hypertension, it is better than her previous findings.  Continue to monitor    Regarding sleep apnea, she is not able to tolerate any CPAP.  She is advised to talk with the pulmonologist    Regarding her leg edema, it is occurring only occasionally.  So advised her to take the Lasix as needed instead of every day    Regarding her connective tissue disorder she has lupus and rheumatoid arthritis and has tried different medication.  She has been followed by the rheumatologist at this time    Regarding her advanced directive, talked to her about living will and power of .  I gave her booklets regarding that    Based on response to Procardia, further recommendations will be made.                   Electronically signed by Ja Bojorquez MD May 2, 2023 14:20 EDT

## 2023-05-02 NOTE — PROGRESS NOTES
Chief Complaint   Patient presents with   • Follow-up     For cardiac management, still having some SOB / fatigue but seems to be improving. Feels like it is all related to having Covid July 2022.    • Shortness of Breath     Recent echo completed. Still waiting to have PFT and CT with her Dr Tim.    • Labs     Most recent labs from Feb in chart. Pt had to try Zetia first before going to a PCSk9, pt was unable to the Zetia due to myalgia. PCSK9's going to cost over $1600.00 for her part.    • Med Refill     Refills needed on cardiac meds. 90 days to [facility].        CARDIAC COMPLAINTS  dyspnea and fatigue        Subjective   Yessenia Baxter is a 63 y.o. female came in today for her regular follow-up visit.  She has history of hypertension, hypercholesterolemia for which she could not tolerate most of the medications.  She also has history of connective tissue problem for which she sees rheumatologist.  In December she was trying to back up most of the [place] products and developed pain in her legs and hip.  Was told that she has hamstring tendinitis and is being treated with steroids.  She also developed COVID infection after which she started getting increasing shortness of breath.  She called our office regarding that so we did an echocardiogram and found the LV function is still normal.  She does have moderate AI.  Her PA pressure did come down a little bit.  She also had lab work done and found the LDL was significantly elevated.  We tried Zetia since she could not take any statin.  After about 3 weeks of Zetia she started developing severe myalgia unable to walk.  Her blood pressure also has been significantly elevated for which she was tried Aldactone but developed reaction to that.  We tried to put her on PCSK9 inhibitor but turned out her copayment is more than thousand dollars.  She came today feeling depressed.  Still having shortness of breath still having muscle ache.  She has been followed  by a pulmonologist regarding her lungs.  She is due to have a PFT done and a CT done.              Cardiac History  Past Surgical History:   Procedure Laterality Date   • CARDIOVASCULAR STRESS TEST  09/16/2015    R. Stress- (RS) 6 Min, 90% THR. BP- 204/100. Few PVC's. Negative    • CARDIOVASCULAR STRESS TEST  11/28/2016    @RSH. 6 Min, 89%THR. BP- 210/106. Negative   • ECHO - CONVERTED  09/16/2015     Echo- (RS) EF 60%. Mod AI. RVSP- 50 mmHg    • ECHO - CONVERTED  11/28/2016    @RSH. EF 65%. Mild- Mod AI. RVSP-51 mmHg   • ECHO - CONVERTED  11/21/2017    @RS. EF 65%. Mild- Mod AI. Mild MR. RVSP- 54 mmHg   • ECHO - CONVERTED  03/29/2023    EF 65%.Mild-Mod AI, Mild MR. RVSP- 41 mmHg       Current Outpatient Medications   Medication Sig Dispense Refill   • Acetaminophen (TYLENOL ARTHRITIS PAIN PO) Take  by mouth.     • BLACK COHOSH PO Take  by mouth.     • carvedilol (COREG) 12.5 MG tablet Take 1 tablet by mouth 2 (Two) Times a Day. 180 tablet 4   • celecoxib (CeleBREX) 200 MG capsule Take 1 capsule by mouth Daily.     • Cyanocobalamin (VITAMIN B-12 IJ) Inject  as directed Every 30 (Thirty) Days.     • furosemide (LASIX) 20 MG tablet Take 1 tablet by mouth As Needed (Leg edema). 30 tablet 11   • gabapentin (NEURONTIN) 100 MG capsule Take 1 capsule by mouth 2 (Two) Times a Day As Needed.     • levothyroxine (SYNTHROID, LEVOTHROID) 112 MCG tablet Take 1 tablet by mouth Daily.     • losartan (COZAAR) 100 MG tablet Take 1 tablet by mouth Daily. 90 tablet 3   • Multiple Vitamin (MULTI VITAMIN PO) Take  by mouth Daily.     • predniSONE (DELTASONE) 10 MG tablet TAKE 4 TABLETS BY MOUTH ONCE DAILY FOR 4 DAYS THEN TAKE 3 TABLETS ONCE DAILY FOR 4 DAYS THEN TAKE 2 TABLETS ONCE DAILY FOR 4 DAYS THEN TAKE 1 TABLET ONCE DAILY FOR 4 DAYS     • Probiotic Product (SOLUBLE FIBER/PROBIOTICS PO) Take  by mouth.     • VITAMIN A PO Take  by mouth.     • vitamin D (ERGOCALCIFEROL) 36106 UNITS capsule capsule Take 1 capsule by mouth Every 7  (Seven) Days.     • NIFEdipine XL (Procardia XL) 30 MG 24 hr tablet Take 1 tablet by mouth Daily. 30 tablet 6     No current facility-administered medications for this visit.       Allergies  :  Spironolactone, Aspirin, Zetia [ezetimibe], Demerol [meperidine], Latex, Other, Penicillins, Simvastatin, Tetracyclines & related, Crestor [rosuvastatin calcium], and Lipitor [atorvastatin]       Past Medical History:   Diagnosis Date   • Arthritis    • Cyst removed from right hand    • Esophageal reflux    • Fibromyalgia    • H/O shoulder surgery     right   • H/O: hysterectomy    • History of cholecystectomy    • Hypercholesterolemia    • Hypertension    • Hypothyroidism    • Left knee surgery twice    • Lupus     Followed rheumatologist   • RA (rheumatoid arthritis)     Bilateral Hip   • Sarcoidosis        Social History     Socioeconomic History   • Marital status:    Tobacco Use   • Smoking status: Never   • Smokeless tobacco: Never   Vaping Use   • Vaping Use: Never used   Substance and Sexual Activity   • Alcohol use: No   • Drug use: No       Family History   Problem Relation Age of Onset   • Stroke Mother 76   • Other Mother         History of A Fib.   • Heart attack Father    • Heart failure Father    • Other Other          one at age 43 had AVR. one had stents at 67,        Review of Systems   Constitutional: Positive for malaise/fatigue. Negative for decreased appetite.   HENT: Negative for congestion and sore throat.    Eyes: Negative for blurred vision, double vision and visual disturbance.   Cardiovascular: Positive for dyspnea on exertion. Negative for chest pain.   Respiratory: Positive for shortness of breath. Negative for snoring.    Endocrine: Negative for cold intolerance and heat intolerance.   Hematologic/Lymphatic: Negative for adenopathy. Does not bruise/bleed easily.   Skin: Negative for itching, nail changes and skin cancer.   Musculoskeletal: Positive for joint pain and myalgias. Negative  "for arthritis.   Gastrointestinal: Negative for abdominal pain, dysphagia and heartburn.   Genitourinary: Negative for bladder incontinence and frequency.   Neurological: Negative for dizziness, seizures and vertigo.   Psychiatric/Behavioral: Negative for altered mental status.   Allergic/Immunologic: Negative for environmental allergies and hives.       Diabetes- No  Thyroid- abnormal    Objective     BP (!) 176/110   Pulse 64   Ht 172.7 cm (68\")   Wt 91.6 kg (202 lb)   BMI 30.71 kg/m²     Vitals and nursing note reviewed.   Constitutional:       Appearance: Healthy appearance. Not in distress.   Eyes:      Conjunctiva/sclera: Conjunctivae normal.      Pupils: Pupils are equal, round, and reactive to light.   HENT:      Head: Normocephalic.   Pulmonary:      Effort: Pulmonary effort is normal.      Breath sounds: Normal breath sounds.   Cardiovascular:      PMI at left midclavicular line. Normal rate. Regular rhythm. loud S2.      Murmurs: There is a grade 3/4 high frequency blowing decrescendo, early diastolic murmur at the URSB, radiating to the apex.   Abdominal:      General: Bowel sounds are normal.      Palpations: Abdomen is soft.   Musculoskeletal: Normal range of motion.      Cervical back: Normal range of motion and neck supple. Skin:     General: Skin is warm and dry.   Neurological:      Mental Status: Alert, oriented to person, place, and time and oriented to person, place and time.       Procedures            @ASSESSMENT/PLAN@  BMI is >= 30 and <35. (Class 1 Obesity). The following options were offered after discussion;: nutrition counseling/recommendations     Diagnoses and all orders for this visit:    1. Essential hypertension (Primary)  -     losartan (COZAAR) 100 MG tablet; Take 1 tablet by mouth Daily.  Dispense: 90 tablet; Refill: 3  -     carvedilol (COREG) 12.5 MG tablet; Take 1 tablet by mouth 2 (Two) Times a Day.  Dispense: 180 tablet; Refill: 4  -     NIFEdipine XL (Procardia XL) 30 MG " 24 hr tablet; Take 1 tablet by mouth Daily.  Dispense: 30 tablet; Refill: 6    2. Hypercholesteremia    3. Metabolic syndrome    4. Hypothyroidism, unspecified type    5. PHT (pulmonary hypertension)    6. Sleep apnea in adult    7. Bilateral leg edema  -     furosemide (LASIX) 20 MG tablet; Take 1 tablet by mouth As Needed (Leg edema).  Dispense: 30 tablet; Refill: 11    8. Connective tissue disorder    At baseline her heart rate is lower limit of normal.  Her blood pressure is moderately elevated.  Her clinical examination reveals a BMI of 31.  She does have slightly loud second heart sound and early diastolic murmur at the aortic area.    Regarding her hypertension, it is still significantly elevated.  She is already on full dose of Cozaar and tolerable dose of Coreg.  She is not able to take hydrochlorothiazide or Aldactone.  I talked to her about the possible side effect of Procardia XL and gave her a prescription for 30 mg once a day.  She is advised to check her blood pressure every day and call us back if the systolic is still persistently elevated    Regarding her hypercholesterolemia, she has tried all the statin, she has tried Praluent, she has tried Zetia and unable to tolerate any of them.  I am going to see whether we can try Inclisiran (Leqvio).  This has lesser side effects and hopefully it is covered by her insurance or at least affordable    Regarding metabolic syndrome, talked to her again about diet and weight reduction.  I talked to her about cutting down on the carbohydrate    Regarding her hypothyroidism, she is on thyroid supplements and the last TSH level was normal    Regarding her pulmonary hypertension, it is better than her previous findings.  Continue to monitor    Regarding sleep apnea, she is not able to tolerate any CPAP.  She is advised to talk with the pulmonologist    Regarding her leg edema, it is occurring only occasionally.  So advised her to take the Lasix as needed instead of  every day    Regarding her connective tissue disorder she has lupus and rheumatoid arthritis and has tried different medication.  She has been followed by the rheumatologist at this time    Regarding her advanced directive, talked to her about living will and power of .  I gave her booklets regarding that    Based on response to Procardia, further recommendations will be made.                    Electronically signed by Ja Bojorquez MD May 2, 2023 14:20 EDT

## 2023-07-26 DIAGNOSIS — I10 ESSENTIAL HYPERTENSION: ICD-10-CM

## 2023-07-26 RX ORDER — CARVEDILOL 12.5 MG/1
12.5 TABLET ORAL 2 TIMES DAILY
Qty: 180 TABLET | Refills: 4 | Status: CANCELLED | OUTPATIENT
Start: 2023-07-26

## 2023-07-26 NOTE — TELEPHONE ENCOUNTER
----- Message from Tanisha Ulloa LPN sent at 7/26/2023  8:05 AM EDT -----  Regarding: FW: Cholesterol shot  Contact: [phone]    ----- Message -----  From: Yessenia Baxter  Sent: 7/26/2023   7:58 AM EDT  To: Jess Baeza [facility]  Subject: Cholesterol shot                                 Juan Shah, I have not heard back from you on the cholesterol injection that you called me about a while back. Also, I went to Dr. Tim and had the PFT. She was going to send those results and her note to Dr. Bojorquez and I was just wondering if she had done that? She said I need to go back and see dr. Bojorquez. Thank you kirit.

## 2023-07-26 NOTE — TELEPHONE ENCOUNTER
Reduced DLCO is not caused by cardiac.  If she is continue to having symptoms of shortness of breath then she may need a right and left heart catheterization

## 2023-07-27 NOTE — TELEPHONE ENCOUNTER
Talked at length with patient. She said she does have some SOB climbing stairs but does not feel like it's significant enough to have a cath. She reports doing so much better since you started the Nifedipine. Assures me she will call to schedule cath if symptoms become more significant.

## 2023-08-09 ENCOUNTER — TELEPHONE (OUTPATIENT)
Dept: CARDIOLOGY | Facility: CLINIC | Age: 63
End: 2023-08-09
Payer: MEDICARE

## 2023-08-09 DIAGNOSIS — E78.00 HYPERCHOLESTEREMIA: Primary | ICD-10-CM

## 2023-09-27 ENCOUNTER — DISEASE STATE MANAGEMENT VISIT (OUTPATIENT)
Dept: PHARMACY | Facility: HOSPITAL | Age: 63
End: 2023-09-27
Payer: MEDICARE

## 2023-09-27 ENCOUNTER — SPECIALTY PHARMACY (OUTPATIENT)
Dept: PHARMACY | Facility: HOSPITAL | Age: 63
End: 2023-09-27
Payer: MEDICARE

## 2023-09-27 ENCOUNTER — HOSPITAL ENCOUNTER (OUTPATIENT)
Dept: CARDIOLOGY | Facility: HOSPITAL | Age: 63
Discharge: HOME | End: 2023-09-27
Admitting: INTERNAL MEDICINE
Payer: MEDICARE

## 2023-09-27 DIAGNOSIS — E78.00 HYPERCHOLESTEREMIA: Primary | ICD-10-CM

## 2023-09-27 PROCEDURE — 96372 THER/PROPH/DIAG INJ SC/IM: CPT | Performed by: PHARMACIST

## 2023-09-27 PROCEDURE — 25010000002 INCLISIRAN SODIUM 284 MG/1.5ML SOLUTION PREFILLED SYRINGE: Performed by: INTERNAL MEDICINE

## 2023-09-27 RX ORDER — TRAZODONE HYDROCHLORIDE 50 MG/1
12.5 TABLET ORAL NIGHTLY
COMMUNITY
Start: 2023-09-12

## 2023-09-27 RX ORDER — LEFLUNOMIDE 10 MG/1
10 TABLET ORAL 2 TIMES DAILY
COMMUNITY

## 2023-09-27 RX ADMIN — INCLISIRAN 284 MG: 284 INJECTION, SOLUTION SUBCUTANEOUS at 10:33

## 2023-09-27 NOTE — PROGRESS NOTES
[facility]  Lipid Management Program - Leqvio (Inclisirarosalia)     Yessenia Baxter is a 63 y.o. female referred to the [facility] Management [facility] by Dr. Bojorquez for clinical pharmacy and specialty pharmacy management of [facility].  Yessenia Baxter is  treated for hyperlipidemia and currently takes nothing for cholesterol.  In the past, Pt has tried Lipitor, Crestor, and Zetia which all caused myalgias.  She has also tried Praluent (before COVID) but could not afford.     Relevant Past Medical History and Comorbidities  Past Medical History:   Diagnosis Date    Arthritis     Cyst removed from right hand     Esophageal reflux     Fibromyalgia     H/O shoulder surgery     right    H/O: hysterectomy     History of cholecystectomy     Hypercholesterolemia     Hypertension     Hypothyroidism     Left knee surgery twice     Lupus     Followed rheumatologist    RA (rheumatoid arthritis)     Bilateral Hip    Sarcoidosis      Social History     Socioeconomic History    Marital status:    Tobacco Use    Smoking status: Never    Smokeless tobacco: Never   Vaping Use    Vaping Use: Never used   Substance and Sexual Activity    Alcohol use: No    Drug use: No       Allergies  Hydrochlorothiazide, Spironolactone, Aspirin, Zetia [ezetimibe], Demerol [meperidine], Latex, Other, Penicillins, Simvastatin, Tetracyclines & related, Crestor [rosuvastatin calcium], and Lipitor [atorvastatin]    Current Medication List    Current Outpatient Medications:     Acetaminophen (TYLENOL ARTHRITIS PAIN PO), Take 1,000 mg by mouth Every Night., Disp: , Rfl:     BLACK COHOSH PO, Take  by mouth., Disp: , Rfl:     carvedilol (COREG) 12.5 MG tablet, Take 1 tablet by mouth 2 (Two) Times a Day., Disp: 180 tablet, Rfl: 4    celecoxib (CeleBREX) 200 MG capsule, Take 1 capsule by mouth Daily., Disp: , Rfl:     Cyanocobalamin (VITAMIN B-12 IJ), Inject  as directed Every 30 (Thirty) Days., Disp: , Rfl:     furosemide (LASIX) 20 MG  tablet, Take 1 tablet by mouth As Needed (Leg edema)., Disp: 30 tablet, Rfl: 11    gabapentin (NEURONTIN) 100 MG capsule, Take 1 capsule by mouth 2 (Two) Times a Day As Needed., Disp: , Rfl:     leflunomide (ARAVA) 10 MG tablet, Take 1 tablet by mouth 2 (Two) Times a Day., Disp: , Rfl:     levothyroxine (SYNTHROID, LEVOTHROID) 112 MCG tablet, Take 1 tablet by mouth Daily., Disp: , Rfl:     losartan (COZAAR) 100 MG tablet, Take 1 tablet by mouth Daily., Disp: 90 tablet, Rfl: 3    Multiple Vitamin (MULTI VITAMIN PO), Take  by mouth Daily., Disp: , Rfl:     NIFEdipine XL (Procardia XL) 30 MG 24 hr tablet, Take 1 tablet by mouth Daily., Disp: 30 tablet, Rfl: 6    Probiotic Product (SOLUBLE FIBER/PROBIOTICS PO), Take 1 capsule by mouth Daily., Disp: , Rfl:     traZODone (DESYREL) 50 MG tablet, Take 0.25 tablets by mouth Every Night., Disp: , Rfl:     VITAMIN A PO, Take  by mouth., Disp: , Rfl:     vitamin D (ERGOCALCIFEROL) 04476 UNITS capsule capsule, Take 1 capsule by mouth Every 7 (Seven) Days., Disp: , Rfl:     Current Facility-Administered Medications:     Inclisiran Sodium solution prefilled syringe 284 mg, 1.5 mL, Subcutaneous, Once, Ja Bojorquez MD    Drug Interactions  None with Leqvio    Relevant Laboratory Values      Medication Assessment & Plan    Administered Leqvio 284mg SUBQ in the lower right side of her abdomen.   Medication education provided, including:   Common Adverse Effects: Injection site reactions, arthralgias, bronchitis, pain in extremity, and dyspnea.  Potential for allergic reaction.  Go to ED/call 911 with any S/Sx of allergic reaction.   Must be administered by a HCP.  If a dose is missed, please call to reschedule.   Expect LDL reduction, to help reduce cardiovascular risk.   At each visit, patient will need to stay a minimum of 15 min for monitoring   Lipid panel will be checked 4 to 12 weeks after starting therapy, order placed.   Patient will continue regular follow-up with  cardiology  Will follow up in 3 months for next injection.     Gala Ramos, PharmD  9/27/2023  10:25 EDT

## 2023-09-27 NOTE — PROGRESS NOTES
[facility]  Lipid Management Program - Leqvio (Inclisirarosalia)     Yessenia Baxter is a 63 y.o. female referred to the Medication Management Clinic by Dr. Bojorquez for clinical pharmacy and specialty pharmacy management of [facility].  Yessenia Baxter is  treated for hyperlipidemia and currently takes nothing for cholesterol.  In the past, Pt has tried Lipitor, Crestor, and Zetia which all caused myalgias.  She has also tried Praluent (before COVID) but could not afford.     Relevant Past Medical History and Comorbidities  Past Medical History:   Diagnosis Date    Arthritis     Cyst removed from right hand     Esophageal reflux     Fibromyalgia     H/O shoulder surgery     right    H/O: hysterectomy     History of cholecystectomy     Hypercholesterolemia     Hypertension     Hypothyroidism     Left knee surgery twice     Lupus     Followed rheumatologist    RA (rheumatoid arthritis)     Bilateral Hip    Sarcoidosis      Social History     Socioeconomic History    Marital status:    Tobacco Use    Smoking status: Never    Smokeless tobacco: Never   Vaping Use    Vaping Use: Never used   Substance and Sexual Activity    Alcohol use: No    Drug use: No       Allergies  Hydrochlorothiazide, Spironolactone, Aspirin, Zetia [ezetimibe], Demerol [meperidine], Latex, Other, Penicillins, Simvastatin, Tetracyclines & related, Crestor [rosuvastatin calcium], and Lipitor [atorvastatin]    Current Medication List    Current Outpatient Medications:     Acetaminophen (TYLENOL ARTHRITIS PAIN PO), Take 1,000 mg by mouth Every Night., Disp: , Rfl:     BLACK COHOSH PO, Take  by mouth., Disp: , Rfl:     carvedilol (COREG) 12.5 MG tablet, Take 1 tablet by mouth 2 (Two) Times a Day., Disp: 180 tablet, Rfl: 4    celecoxib (CeleBREX) 200 MG capsule, Take 1 capsule by mouth Daily., Disp: , Rfl:     Cyanocobalamin (VITAMIN B-12 IJ), Inject  as directed Every 30 (Thirty) Days., Disp: , Rfl:     furosemide (LASIX) 20 MG  tablet, Take 1 tablet by mouth As Needed (Leg edema)., Disp: 30 tablet, Rfl: 11    gabapentin (NEURONTIN) 100 MG capsule, Take 1 capsule by mouth 2 (Two) Times a Day As Needed., Disp: , Rfl:     leflunomide (ARAVA) 10 MG tablet, Take 1 tablet by mouth 2 (Two) Times a Day., Disp: , Rfl:     levothyroxine (SYNTHROID, LEVOTHROID) 112 MCG tablet, Take 1 tablet by mouth Daily., Disp: , Rfl:     losartan (COZAAR) 100 MG tablet, Take 1 tablet by mouth Daily., Disp: 90 tablet, Rfl: 3    Multiple Vitamin (MULTI VITAMIN PO), Take  by mouth Daily., Disp: , Rfl:     NIFEdipine XL (Procardia XL) 30 MG 24 hr tablet, Take 1 tablet by mouth Daily., Disp: 30 tablet, Rfl: 6    Probiotic Product (SOLUBLE FIBER/PROBIOTICS PO), Take 1 capsule by mouth Daily., Disp: , Rfl:     traZODone (DESYREL) 50 MG tablet, Take 0.25 tablets by mouth Every Night., Disp: , Rfl:     VITAMIN A PO, Take  by mouth., Disp: , Rfl:     vitamin D (ERGOCALCIFEROL) 84061 UNITS capsule capsule, Take 1 capsule by mouth Every 7 (Seven) Days., Disp: , Rfl:   No current facility-administered medications for this visit.    Drug Interactions  None with Leqvio    Relevant Laboratory Values      Medication Assessment & Plan    Administered Leqvio 284mg SUBQ in the lower right side of her abdomen.   Medication education provided, including:   Common Adverse Effects: Injection site reactions, arthralgias, bronchitis, pain in extremity, and dyspnea.  Potential for allergic reaction.  Go to ED/call 911 with any S/Sx of allergic reaction.   Must be administered by a HCP.  If a dose is missed, please call to reschedule.   Expect LDL reduction, to help reduce cardiovascular risk.   At each visit, patient will need to stay a minimum of 15 min for monitoring   Lipid panel will be checked 4 to 12 weeks after starting therapy, order placed.   Patient will continue regular follow-up with cardiology  Will follow up in 3 months for next injection.     Gala Ramos,  PharmD  9/27/2023  10:34 EDT

## 2023-10-31 DIAGNOSIS — I10 ESSENTIAL HYPERTENSION: ICD-10-CM

## 2023-11-02 RX ORDER — NIFEDIPINE 30 MG/1
TABLET, EXTENDED RELEASE ORAL DAILY
Qty: 30 TABLET | Refills: 0 | Status: SHIPPED | OUTPATIENT
Start: 2023-11-02

## 2023-11-14 ENCOUNTER — LAB (OUTPATIENT)
Dept: LAB | Facility: HOSPITAL | Age: 63
End: 2023-11-14
Payer: MEDICARE

## 2023-11-14 DIAGNOSIS — E78.00 HYPERCHOLESTEREMIA: ICD-10-CM

## 2023-11-14 LAB
CHOLEST SERPL-MCNC: 174 MG/DL (ref 0–200)
HDLC SERPL-MCNC: 78 MG/DL (ref 40–60)
LDLC SERPL CALC-MCNC: 78 MG/DL (ref 0–100)
LDLC/HDLC SERPL: 0.97 {RATIO}
TRIGL SERPL-MCNC: 100 MG/DL (ref 0–150)
VLDLC SERPL-MCNC: 18 MG/DL (ref 5–40)

## 2023-11-14 PROCEDURE — 80061 LIPID PANEL: CPT | Performed by: INTERNAL MEDICINE

## 2023-11-29 NOTE — PROGRESS NOTES
Cardiac rehab ordered and benefits reviewed with patient. Referral provided to the patient at discharge to an early outpatient cardiac rehab phase II program. Necessary patient information communicated to the CR program. The 1st appointment has either   been made or the patient has been provided with the contact information to make this appointment.  Patients enrollment in [facility] is complete.    Patients ID# is [id number]

## 2023-12-18 ENCOUNTER — OFFICE VISIT (OUTPATIENT)
Dept: CARDIOLOGY | Facility: CLINIC | Age: 63
End: 2023-12-18
Payer: MEDICARE

## 2023-12-18 VITALS
BODY MASS INDEX: 29.86 KG/M2 | HEART RATE: 62 BPM | HEIGHT: 68 IN | SYSTOLIC BLOOD PRESSURE: 130 MMHG | WEIGHT: 197 LBS | DIASTOLIC BLOOD PRESSURE: 62 MMHG

## 2023-12-18 DIAGNOSIS — I10 ESSENTIAL HYPERTENSION: Primary | ICD-10-CM

## 2023-12-18 DIAGNOSIS — I27.20 PHT (PULMONARY HYPERTENSION): ICD-10-CM

## 2023-12-18 DIAGNOSIS — E03.9 HYPOTHYROIDISM, UNSPECIFIED TYPE: ICD-10-CM

## 2023-12-18 DIAGNOSIS — G47.30 SLEEP APNEA IN ADULT: ICD-10-CM

## 2023-12-18 DIAGNOSIS — I35.1 NONRHEUMATIC AORTIC VALVE INSUFFICIENCY: ICD-10-CM

## 2023-12-18 DIAGNOSIS — E78.00 HYPERCHOLESTEREMIA: ICD-10-CM

## 2023-12-18 DIAGNOSIS — E88.810 METABOLIC SYNDROME: ICD-10-CM

## 2023-12-18 DIAGNOSIS — R60.0 BILATERAL LEG EDEMA: ICD-10-CM

## 2023-12-18 PROCEDURE — 1160F RVW MEDS BY RX/DR IN RCRD: CPT | Performed by: INTERNAL MEDICINE

## 2023-12-18 PROCEDURE — 3075F SYST BP GE 130 - 139MM HG: CPT | Performed by: INTERNAL MEDICINE

## 2023-12-18 PROCEDURE — 1159F MED LIST DOCD IN RCRD: CPT | Performed by: INTERNAL MEDICINE

## 2023-12-18 PROCEDURE — 3078F DIAST BP <80 MM HG: CPT | Performed by: INTERNAL MEDICINE

## 2023-12-18 PROCEDURE — 99214 OFFICE O/P EST MOD 30 MIN: CPT | Performed by: INTERNAL MEDICINE

## 2023-12-18 RX ORDER — LOSARTAN POTASSIUM 100 MG/1
100 TABLET ORAL DAILY
Qty: 90 TABLET | Refills: 3 | Status: SHIPPED | OUTPATIENT
Start: 2023-12-18

## 2023-12-18 RX ORDER — LANOLIN ALCOHOL/MO/W.PET/CERES
325 CREAM (GRAM) TOPICAL
COMMUNITY
Start: 2023-10-14

## 2023-12-18 RX ORDER — NIFEDIPINE 30 MG/1
30 TABLET, EXTENDED RELEASE ORAL DAILY
Qty: 90 TABLET | Refills: 3 | Status: SHIPPED | OUTPATIENT
Start: 2023-12-18

## 2023-12-18 RX ORDER — FUROSEMIDE 20 MG/1
20 TABLET ORAL AS NEEDED
Qty: 30 TABLET | Refills: 11 | Status: SHIPPED | OUTPATIENT
Start: 2023-12-18

## 2023-12-18 RX ORDER — CARVEDILOL 12.5 MG/1
12.5 TABLET ORAL 2 TIMES DAILY
Qty: 180 TABLET | Refills: 4 | Status: SHIPPED | OUTPATIENT
Start: 2023-12-18

## 2023-12-18 NOTE — LETTER
December 18, 2023       No Recipients    Patient: Yessenia Baxter   YOB: 1960   Date of Visit: 12/18/2023     Dear Jaxson Hale MD:       Thank you for referring Yessenia Baxter to me for evaluation. Below are the relevant portions of my assessment and plan of care.    If you have questions, please do not hesitate to call me. I look forward to following Yessenia along with you.         Sincerely,        Ja Bojorquez MD        CC:   No Recipients    Ja Bojorquez MD  12/18/23 1320  Sign when Signing Visit  Chief Complaint   Patient presents with   • Follow-up     Pt is here for cardiac follow up.  Pt states she has SOB when she is going up stairs.  She states it is getting a little worse.  She also has occasional palpitations.  She denies CP or dizziness.  She does not take a daily ASA.     • Med Refill     Pt request 90 day refills to be sent to [facility] in [place].  Medications were verified by the pt.     • Lab Work     Pt states their last labs were 11/14/23.         CARDIAC COMPLAINTS  dyspnea and palpitations        Subjective  Yessenia Baxter is a 63 y.o. female came in today for her regular follow-up visit.  She has history of hypertension, hypercholesterolemia who also has connective tissue problem.  She has valvular heart disease being managed medically.  She came today stating that she has some shortness of breath mostly when she climbs steps but otherwise feeling better.  She also has occasional palpitation.  She denies having any chest pain dizziness.  She is intolerant to any of the statin and finally started on Leqvio.  She had 1 dose and scheduled to have the 3-month dose now.  She did undergo lab work and found the total cholesterol is 174 with the LDL of 78.  In February her LDL was 171.              Cardiac History  Past Surgical History:   Procedure Laterality Date   • CARDIOVASCULAR STRESS TEST  09/16/2015    R. Stress- ([facility]) 6 Min, 90% THR. BP- 204/100. Few PVC's. Negative     • CARDIOVASCULAR STRESS TEST  11/28/2016    @[facility]. 6 Min, 89%THR. BP- 210/106. Negative   • ECHO - CONVERTED  09/16/2015     Echo- ([facility]) EF 60%. Mod AI. RVSP- 50 mmHg    • ECHO - CONVERTED  11/28/2016    @[facility]. EF 65%. Mild- Mod AI. RVSP-51 mmHg   • ECHO - CONVERTED  11/21/2017    @[facility]. EF 65%. Mild- Mod AI. Mild MR. RVSP- 54 mmHg   • ECHO - CONVERTED  03/29/2023    EF 65%.Mild-Mod AI, Mild MR. RVSP- 41 mmHg       Current Outpatient Medications   Medication Sig Dispense Refill   • Acetaminophen (TYLENOL ARTHRITIS PAIN PO) Take 1,000 mg by mouth Every Night.     • BLACK COHOSH PO Take  by mouth.     • carvedilol (COREG) 12.5 MG tablet Take 1 tablet by mouth 2 (Two) Times a Day. 180 tablet 4   • celecoxib (CeleBREX) 200 MG capsule Take 1 capsule by mouth Daily.     • Cyanocobalamin (VITAMIN B-12 IJ) Inject  as directed Every 30 (Thirty) Days.     • ferrous sulfate 325 (65 FE) MG EC tablet Take 1 tablet by mouth Daily With Breakfast.     • furosemide (LASIX) 20 MG tablet Take 1 tablet by mouth As Needed (Leg edema). 30 tablet 11   • gabapentin (NEURONTIN) 100 MG capsule Take 1 capsule by mouth 2 (Two) Times a Day As Needed.     • Inclisiran Sodium (LEQVIO SC) Inject  under the skin into the appropriate area as directed.     • leflunomide (ARAVA) 10 MG tablet Take 1 tablet by mouth 2 (Two) Times a Day.     • levothyroxine (SYNTHROID, LEVOTHROID) 112 MCG tablet Take 1 tablet by mouth Daily.     • losartan (COZAAR) 100 MG tablet Take 1 tablet by mouth Daily. 90 tablet 3   • Multiple Vitamin (MULTI VITAMIN PO) Take  by mouth Daily.     • NIFEdipine XL (PROCARDIA XL) 30 MG 24 hr tablet Take 1 tablet by mouth Daily. 90 tablet 3   • Probiotic Product (SOLUBLE FIBER/PROBIOTICS PO) Take 1 capsule by mouth Daily.     • traZODone (DESYREL) 50 MG tablet Take 0.25 tablets by mouth Every Night.     • VITAMIN A PO Take  by mouth.     • vitamin D (ERGOCALCIFEROL) 62761 UNITS capsule capsule Take 1 capsule by mouth Every 7 (Seven)  Days.       No current facility-administered medications for this visit.       Allergies  :  Hydrochlorothiazide, Spironolactone, Aspirin, Zetia [ezetimibe], Demerol [meperidine], Latex, Other, Penicillins, Simvastatin, Tetracyclines & related, Crestor [rosuvastatin calcium], and Lipitor [atorvastatin]       Past Medical History:   Diagnosis Date   • Arthritis    • Cyst removed from right hand    • Esophageal reflux    • Fibromyalgia    • H/O shoulder surgery     right   • H/O: hysterectomy    • History of cholecystectomy    • Hypercholesterolemia    • Hypertension    • Hypothyroidism    • Left knee surgery twice    • Lupus     Followed rheumatologist   • RA (rheumatoid arthritis)     Bilateral Hip   • Sarcoidosis        Social History     Socioeconomic History   • Marital status:    Tobacco Use   • Smoking status: Never   • Smokeless tobacco: Never   Vaping Use   • Vaping Use: Never used   Substance and Sexual Activity   • Alcohol use: No   • Drug use: No       Family History   Problem Relation Age of Onset   • Stroke Mother 76   • Other Mother         History of A Fib.   • Heart attack Father    • Heart failure Father    • Other Other          one at age 43 had AVR. one had stents at 67,        Review of Systems   Constitutional: Negative for decreased appetite and malaise/fatigue.   HENT:  Negative for congestion and sore throat.    Eyes:  Negative for blurred vision, double vision and visual disturbance.   Cardiovascular:  Positive for dyspnea on exertion and palpitations. Negative for chest pain.   Respiratory:  Negative for shortness of breath and snoring.    Endocrine: Negative for cold intolerance and heat intolerance.   Hematologic/Lymphatic: Negative for adenopathy. Does not bruise/bleed easily.   Skin:  Negative for itching, nail changes and skin cancer.   Musculoskeletal:  Negative for arthritis and myalgias.   Gastrointestinal:  Negative for abdominal pain, dysphagia and heartburn.  "  Genitourinary:  Negative for bladder incontinence and frequency.   Neurological:  Negative for dizziness, seizures and vertigo.   Psychiatric/Behavioral:  Negative for altered mental status.    Allergic/Immunologic: Negative for environmental allergies and hives.     Diabetes- No  Thyroid- abnormal    Objective    /62 (BP Location: Left arm, Patient Position: Sitting)   Pulse 62   Ht 172.7 cm (68\")   Wt 89.4 kg (197 lb)   BMI 29.95 kg/m²     Vitals and nursing note reviewed.   Constitutional:       Appearance: Healthy appearance. Not in distress.   Eyes:      Conjunctiva/sclera: Conjunctivae normal.      Pupils: Pupils are equal, round, and reactive to light.   HENT:      Head: Normocephalic.   Pulmonary:      Effort: Pulmonary effort is normal.      Breath sounds: Normal breath sounds.   Cardiovascular:      PMI at left midclavicular line. Normal rate. Regular rhythm.      Murmurs: There is a grade 3/6 high frequency blowing holosystolic murmur at the apex. There is a grade 3/4 high frequency blowing decrescendo, early diastolic murmur at the URSB, radiating to the apex.   Abdominal:      General: Bowel sounds are normal.      Palpations: Abdomen is soft.   Musculoskeletal: Normal range of motion.      Cervical back: Normal range of motion and neck supple. Skin:     General: Skin is warm and dry.   Neurological:      Mental Status: Alert, oriented to person, place, and time and oriented to person, place and time.   Procedures            @ASSESSMENT/PLAN@        Diagnoses and all orders for this visit:    1. Essential hypertension (Primary)  -     carvedilol (COREG) 12.5 MG tablet; Take 1 tablet by mouth 2 (Two) Times a Day.  Dispense: 180 tablet; Refill: 4  -     losartan (COZAAR) 100 MG tablet; Take 1 tablet by mouth Daily.  Dispense: 90 tablet; Refill: 3  -     NIFEdipine XL (PROCARDIA XL) 30 MG 24 hr tablet; Take 1 tablet by mouth Daily.  Dispense: 90 tablet; Refill: 3    2. Hypercholesteremia    3. " Nonrheumatic aortic valve insufficiency    4. Metabolic syndrome    5. PHT (pulmonary hypertension)    6. Hypothyroidism, unspecified type    7. Sleep apnea in adult    8. Bilateral leg edema  -     furosemide (LASIX) 20 MG tablet; Take 1 tablet by mouth As Needed (Leg edema).  Dispense: 30 tablet; Refill: 11    At baseline her heart rate is stable.  Her blood pressure is normal.  Her BMI is still around 30.  She lost only about 5 pounds.  Her cardiovascular examination reveals systolic murmur at the mitral area and early diastolic murmur at the aortic area.    Regarding her hypertension, it seems to be very well-controlled with a combination of Cozaar, Coreg and Procardia XL.  Continue the same for now she will be having the labs done soon regarding the electrolytes and renal function    Regarding her hypercholesterolemia, she is not tolerating any of the statins but with this medication her LDL has come down.  Continue the same    Regarding the aortic valvular insufficiency, it is still remains mild to moderate.  Continue to monitor    Regarding a pulmonary hypertension, it has now come down to 41.  We can recheck it after 6 months    Regarding metabolic syndrome, she is still having BMI close to 30.  I talked to her about low-carb diet    Regarding hypothyroidism, she does take thyroid supplements and is being managed by you    Regarding sleep apnea, she does use CPAP regularly    Regarding the leg edema, she does take Lasix as needed    Regarding advanced directive, she does have a living will and power of     I will see her back in 6 months or sooner if needed                    Electronically signed by Ja Bojorquez MD December 18, 2023 13:15 EST

## 2023-12-18 NOTE — PROGRESS NOTES
Chief Complaint   Patient presents with   • Follow-up     Pt is here for cardiac follow up.  Pt states she has SOB when she is going up stairs.  She states it is getting a little worse.  She also has occasional palpitations.  She denies CP or dizziness.  She does not take a daily ASA.     • Med Refill     Pt request 90 day refills to be sent to [facility] in [place].  Medications were verified by the pt.     • Lab Work     Pt states their last labs were 11/14/23.         CARDIAC COMPLAINTS  dyspnea and palpitations        Subjective   Yessenia Baxter is a 63 y.o. female came in today for her regular follow-up visit.  She has history of hypertension, hypercholesterolemia who also has connective tissue problem.  She has valvular heart disease being managed medically.  She came today stating that she has some shortness of breath mostly when she climbs steps but otherwise feeling better.  She also has occasional palpitation.  She denies having any chest pain dizziness.  She is intolerant to any of the statin and finally started on Leqvio.  She had 1 dose and scheduled to have the 3-month dose now.  She did undergo lab work and found the total cholesterol is 174 with the LDL of 78.  In February her LDL was 171.              Cardiac History  Past Surgical History:   Procedure Laterality Date   • CARDIOVASCULAR STRESS TEST  09/16/2015    R. Stress- ([facility]) 6 Min, 90% THR. BP- 204/100. Few PVC's. Negative    • CARDIOVASCULAR STRESS TEST  11/28/2016    @[facility]. 6 Min, 89%THR. BP- 210/106. Negative   • ECHO - CONVERTED  09/16/2015     Echo- ([facility]) EF 60%. Mod AI. RVSP- 50 mmHg    • ECHO - CONVERTED  11/28/2016    @[facility]. EF 65%. Mild- Mod AI. RVSP-51 mmHg   • ECHO - CONVERTED  11/21/2017    @[facility] EF 65%. Mild- Mod AI. Mild MR. RVSP- 54 mmHg   • ECHO - CONVERTED  03/29/2023    EF 65%.Mild-Mod AI, Mild MR. RVSP- 41 mmHg       Current Outpatient Medications   Medication Sig Dispense Refill   • Acetaminophen (TYLENOL ARTHRITIS PAIN PO) Take  1,000 mg by mouth Every Night.     • BLACK COHOSH PO Take  by mouth.     • carvedilol (COREG) 12.5 MG tablet Take 1 tablet by mouth 2 (Two) Times a Day. 180 tablet 4   • celecoxib (CeleBREX) 200 MG capsule Take 1 capsule by mouth Daily.     • Cyanocobalamin (VITAMIN B-12 IJ) Inject  as directed Every 30 (Thirty) Days.     • ferrous sulfate 325 (65 FE) MG EC tablet Take 1 tablet by mouth Daily With Breakfast.     • furosemide (LASIX) 20 MG tablet Take 1 tablet by mouth As Needed (Leg edema). 30 tablet 11   • gabapentin (NEURONTIN) 100 MG capsule Take 1 capsule by mouth 2 (Two) Times a Day As Needed.     • Inclisiran Sodium (LEQVIO SC) Inject  under the skin into the appropriate area as directed.     • leflunomide (ARAVA) 10 MG tablet Take 1 tablet by mouth 2 (Two) Times a Day.     • levothyroxine (SYNTHROID, LEVOTHROID) 112 MCG tablet Take 1 tablet by mouth Daily.     • losartan (COZAAR) 100 MG tablet Take 1 tablet by mouth Daily. 90 tablet 3   • Multiple Vitamin (MULTI VITAMIN PO) Take  by mouth Daily.     • NIFEdipine XL (PROCARDIA XL) 30 MG 24 hr tablet Take 1 tablet by mouth Daily. 90 tablet 3   • Probiotic Product (SOLUBLE FIBER/PROBIOTICS PO) Take 1 capsule by mouth Daily.     • traZODone (DESYREL) 50 MG tablet Take 0.25 tablets by mouth Every Night.     • VITAMIN A PO Take  by mouth.     • vitamin D (ERGOCALCIFEROL) 79418 UNITS capsule capsule Take 1 capsule by mouth Every 7 (Seven) Days.       No current facility-administered medications for this visit.       Allergies  :  Hydrochlorothiazide, Spironolactone, Aspirin, Zetia [ezetimibe], Demerol [meperidine], Latex, Other, Penicillins, Simvastatin, Tetracyclines & related, Crestor [rosuvastatin calcium], and Lipitor [atorvastatin]       Past Medical History:   Diagnosis Date   • Arthritis    • Cyst removed from right hand    • Esophageal reflux    • Fibromyalgia    • H/O shoulder surgery     right   • H/O: hysterectomy    • History of cholecystectomy    •  "Hypercholesterolemia    • Hypertension    • Hypothyroidism    • Left knee surgery twice    • Lupus     Followed rheumatologist   • RA (rheumatoid arthritis)     Bilateral Hip   • Sarcoidosis        Social History     Socioeconomic History   • Marital status:    Tobacco Use   • Smoking status: Never   • Smokeless tobacco: Never   Vaping Use   • Vaping Use: Never used   Substance and Sexual Activity   • Alcohol use: No   • Drug use: No       Family History   Problem Relation Age of Onset   • Stroke Mother 76   • Other Mother         History of A Fib.   • Heart attack Father    • Heart failure Father    • Other Other          one at age 43 had AVR. one had stents at 67,        Review of Systems   Constitutional: Negative for decreased appetite and malaise/fatigue.   HENT:  Negative for congestion and sore throat.    Eyes:  Negative for blurred vision, double vision and visual disturbance.   Cardiovascular:  Positive for dyspnea on exertion and palpitations. Negative for chest pain.   Respiratory:  Negative for shortness of breath and snoring.    Endocrine: Negative for cold intolerance and heat intolerance.   Hematologic/Lymphatic: Negative for adenopathy. Does not bruise/bleed easily.   Skin:  Negative for itching, nail changes and skin cancer.   Musculoskeletal:  Negative for arthritis and myalgias.   Gastrointestinal:  Negative for abdominal pain, dysphagia and heartburn.   Genitourinary:  Negative for bladder incontinence and frequency.   Neurological:  Negative for dizziness, seizures and vertigo.   Psychiatric/Behavioral:  Negative for altered mental status.    Allergic/Immunologic: Negative for environmental allergies and hives.     Diabetes- No  Thyroid- abnormal    Objective     /62 (BP Location: Left arm, Patient Position: Sitting)   Pulse 62   Ht 172.7 cm (68\")   Wt 89.4 kg (197 lb)   BMI 29.95 kg/m²     Vitals and nursing note reviewed.   Constitutional:       Appearance: Healthy " appearance. Not in distress.   Eyes:      Conjunctiva/sclera: Conjunctivae normal.      Pupils: Pupils are equal, round, and reactive to light.   HENT:      Head: Normocephalic.   Pulmonary:      Effort: Pulmonary effort is normal.      Breath sounds: Normal breath sounds.   Cardiovascular:      PMI at left midclavicular line. Normal rate. Regular rhythm.      Murmurs: There is a grade 3/6 high frequency blowing holosystolic murmur at the apex. There is a grade 3/4 high frequency blowing decrescendo, early diastolic murmur at the URSB, radiating to the apex.   Abdominal:      General: Bowel sounds are normal.      Palpations: Abdomen is soft.   Musculoskeletal: Normal range of motion.      Cervical back: Normal range of motion and neck supple. Skin:     General: Skin is warm and dry.   Neurological:      Mental Status: Alert, oriented to person, place, and time and oriented to person, place and time.   Procedures            @ASSESSMENT/PLAN@        Diagnoses and all orders for this visit:    1. Essential hypertension (Primary)  -     carvedilol (COREG) 12.5 MG tablet; Take 1 tablet by mouth 2 (Two) Times a Day.  Dispense: 180 tablet; Refill: 4  -     losartan (COZAAR) 100 MG tablet; Take 1 tablet by mouth Daily.  Dispense: 90 tablet; Refill: 3  -     NIFEdipine XL (PROCARDIA XL) 30 MG 24 hr tablet; Take 1 tablet by mouth Daily.  Dispense: 90 tablet; Refill: 3    2. Hypercholesteremia    3. Nonrheumatic aortic valve insufficiency    4. Metabolic syndrome    5. PHT (pulmonary hypertension)    6. Hypothyroidism, unspecified type    7. Sleep apnea in adult    8. Bilateral leg edema  -     furosemide (LASIX) 20 MG tablet; Take 1 tablet by mouth As Needed (Leg edema).  Dispense: 30 tablet; Refill: 11    At baseline her heart rate is stable.  Her blood pressure is normal.  Her BMI is still around 30.  She lost only about 5 pounds.  Her cardiovascular examination reveals systolic murmur at the mitral area and early  diastolic murmur at the aortic area.    Regarding her hypertension, it seems to be very well-controlled with a combination of Cozaar, Coreg and Procardia XL.  Continue the same for now she will be having the labs done soon regarding the electrolytes and renal function    Regarding her hypercholesterolemia, she is not tolerating any of the statins but with this medication her LDL has come down.  Continue the same    Regarding the aortic valvular insufficiency, it is still remains mild to moderate.  Continue to monitor    Regarding a pulmonary hypertension, it has now come down to 41.  We can recheck it after 6 months    Regarding metabolic syndrome, she is still having BMI close to 30.  I talked to her about low-carb diet    Regarding hypothyroidism, she does take thyroid supplements and is being managed by you    Regarding sleep apnea, she does use CPAP regularly    Regarding the leg edema, she does take Lasix as needed    Regarding advanced directive, she does have a living will and power of     I will see her back in 6 months or sooner if needed                    Electronically signed by Ja Bojorquez MD December 18, 2023 13:15 EST

## 2023-12-20 ENCOUNTER — SPECIALTY PHARMACY (OUTPATIENT)
Dept: PHARMACY | Facility: HOSPITAL | Age: 63
End: 2023-12-20
Payer: MEDICARE

## 2023-12-20 ENCOUNTER — HOSPITAL ENCOUNTER (OUTPATIENT)
Dept: CARDIOLOGY | Facility: HOSPITAL | Age: 63
Discharge: HOME | End: 2023-12-20
Admitting: INTERNAL MEDICINE
Payer: MEDICARE

## 2023-12-20 ENCOUNTER — DISEASE STATE MANAGEMENT VISIT (OUTPATIENT)
Dept: PHARMACY | Facility: HOSPITAL | Age: 63
End: 2023-12-20
Payer: MEDICARE

## 2023-12-20 DIAGNOSIS — E78.00 HYPERCHOLESTEREMIA: Primary | ICD-10-CM

## 2023-12-20 PROCEDURE — 96372 THER/PROPH/DIAG INJ SC/IM: CPT

## 2023-12-20 PROCEDURE — 25010000002 INCLISIRAN SODIUM 284 MG/1.5ML SOLUTION PREFILLED SYRINGE: Performed by: INTERNAL MEDICINE

## 2023-12-20 RX ADMIN — INCLISIRAN 284 MG: 284 INJECTION, SOLUTION SUBCUTANEOUS at 10:25

## 2023-12-20 NOTE — PROGRESS NOTES
[facility]  Lipid Management Program - Leqvio (Inclisiran)     Yessenia Baxter is a 63 y.o. female referred to the [facility] by Dr. Bojorquez for clinical pharmacy and specialty pharmacy management of [facility].  Yessenia Baxter is  treated for hyperlipidemia and currently takes nothing for cholesterol.  In the past, Pt has tried Lipitor, Crestor, and Zetia which all caused myalgias.  She has also tried Praluent (before COVID) but could not afford.     Patient reports tolerating previous injection well. She says that she had a little itching right at the injection site but that it went away fast.     Relevant Past Medical History and Comorbidities  Past Medical History:   Diagnosis Date    Arthritis     Cyst removed from right hand     Esophageal reflux     Fibromyalgia     H/O shoulder surgery     right    H/O: hysterectomy     History of cholecystectomy     Hypercholesterolemia     Hypertension     Hypothyroidism     Left knee surgery twice     Lupus     Followed rheumatologist    RA (rheumatoid arthritis)     Bilateral Hip    Sarcoidosis      Social History     Socioeconomic History    Marital status:    Tobacco Use    Smoking status: Never    Smokeless tobacco: Never   Vaping Use    Vaping Use: Never used   Substance and Sexual Activity    Alcohol use: No    Drug use: No       Allergies  Hydrochlorothiazide, Spironolactone, Aspirin, Zetia [ezetimibe], Demerol [meperidine], Latex, Other, Penicillins, Simvastatin, Tetracyclines & related, Crestor [rosuvastatin calcium], and Lipitor [atorvastatin]    Current Medication List    Current Outpatient Medications:     Acetaminophen (TYLENOL ARTHRITIS PAIN PO), Take 1,000 mg by mouth Every Night., Disp: , Rfl:     BLACK COHOSH PO, Take  by mouth., Disp: , Rfl:     carvedilol (COREG) 12.5 MG tablet, Take 1 tablet by mouth 2 (Two) Times a Day., Disp: 180 tablet, Rfl: 4    celecoxib (CeleBREX) 200 MG capsule, Take 1 capsule by mouth  Daily., Disp: , Rfl:     Cyanocobalamin (VITAMIN B-12 IJ), Inject  as directed Every 30 (Thirty) Days., Disp: , Rfl:     ferrous sulfate 325 (65 FE) MG EC tablet, Take 1 tablet by mouth Daily With Breakfast., Disp: , Rfl:     furosemide (LASIX) 20 MG tablet, Take 1 tablet by mouth As Needed (Leg edema)., Disp: 30 tablet, Rfl: 11    gabapentin (NEURONTIN) 100 MG capsule, Take 1 capsule by mouth 2 (Two) Times a Day As Needed., Disp: , Rfl:     Inclisiran Sodium (LEQVIO SC), Inject  under the skin into the appropriate area as directed., Disp: , Rfl:     leflunomide (ARAVA) 10 MG tablet, Take 1 tablet by mouth 2 (Two) Times a Day., Disp: , Rfl:     levothyroxine (SYNTHROID, LEVOTHROID) 112 MCG tablet, Take 1 tablet by mouth Daily., Disp: , Rfl:     losartan (COZAAR) 100 MG tablet, Take 1 tablet by mouth Daily., Disp: 90 tablet, Rfl: 3    Multiple Vitamin (MULTI VITAMIN PO), Take  by mouth Daily., Disp: , Rfl:     NIFEdipine XL (PROCARDIA XL) 30 MG 24 hr tablet, Take 1 tablet by mouth Daily., Disp: 90 tablet, Rfl: 3    Probiotic Product (SOLUBLE FIBER/PROBIOTICS PO), Take 1 capsule by mouth Daily., Disp: , Rfl:     traZODone (DESYREL) 50 MG tablet, Take 0.25 tablets by mouth Every Night., Disp: , Rfl:     VITAMIN A PO, Take  by mouth., Disp: , Rfl:     vitamin D (ERGOCALCIFEROL) 60384 UNITS capsule capsule, Take 1 capsule by mouth Every 7 (Seven) Days., Disp: , Rfl:     Drug Interactions  None with Leqvio    Relevant Laboratory Values    Lipid Panel          11/14/2023    14:56   Lipid Panel   Total Cholesterol 174    Triglycerides 100    HDL Cholesterol 78    VLDL Cholesterol 18    LDL Cholesterol  78    LDL/HDL Ratio 0.97         Medication Assessment & Plan    Administered Leqvio 284mg SUBQ in the lower left side of her abdomen.   First dose: 9/27/23  Second dose: 12/20/23  Medication education provided, including:   Common Adverse Effects: Injection site reactions, arthralgias, bronchitis, pain in extremity, and  dyspnea.  Potential for allergic reaction.  Go to ED/call 911 with any S/Sx of allergic reaction.   Must be administered by a HCP.  If a dose is missed, please call to reschedule.   Expect LDL reduction, to help reduce cardiovascular risk.   At each visit, patient will need to stay a minimum of 15 min for monitoring   Most recent LDL was 78 on 11/14/23 after first dose of Leqvio. This has improved from an LDL of 171 on 2/16/23.  Patient will continue regular follow-up with cardiology.  Will follow up in 6 months for next injection.     Monisha Eng, PharmD  12/20/2023  10:08 EST

## 2023-12-20 NOTE — PROGRESS NOTES
[facility]  Lipid Management Program - Leqvio (Inclisiran)     Yessenia Baxter is a 63 y.o. female referred to the [facility] by Dr. Bojorquez for clinical pharmacy and specialty pharmacy management of [facility].  Yessenia Baxter is  treated for hyperlipidemia and currently takes nothing for cholesterol.  In the past, Pt has tried Lipitor, Crestor, and Zetia which all caused myalgias.  She has also tried Praluent (before COVID) but could not afford.     Patient reports tolerating previous injection well. She says that she had a little itching right at the injection site but that it went away fast.     Relevant Past Medical History and Comorbidities  Past Medical History:   Diagnosis Date    Arthritis     Cyst removed from right hand     Esophageal reflux     Fibromyalgia     H/O shoulder surgery     right    H/O: hysterectomy     History of cholecystectomy     Hypercholesterolemia     Hypertension     Hypothyroidism     Left knee surgery twice     Lupus     Followed rheumatologist    RA (rheumatoid arthritis)     Bilateral Hip    Sarcoidosis      Social History     Socioeconomic History    Marital status:    Tobacco Use    Smoking status: Never    Smokeless tobacco: Never   Vaping Use    Vaping Use: Never used   Substance and Sexual Activity    Alcohol use: No    Drug use: No       Allergies  Hydrochlorothiazide, Spironolactone, Aspirin, Zetia [ezetimibe], Demerol [meperidine], Latex, Other, Penicillins, Simvastatin, Tetracyclines & related, Crestor [rosuvastatin calcium], and Lipitor [atorvastatin]    Current Medication List    Current Outpatient Medications:     Acetaminophen (TYLENOL ARTHRITIS PAIN PO), Take 1,000 mg by mouth Every Night., Disp: , Rfl:     BLACK COHOSH PO, Take  by mouth., Disp: , Rfl:     carvedilol (COREG) 12.5 MG tablet, Take 1 tablet by mouth 2 (Two) Times a Day., Disp: 180 tablet, Rfl: 4    celecoxib (CeleBREX) 200 MG capsule, Take 1 capsule by mouth  Daily., Disp: , Rfl:     Cyanocobalamin (VITAMIN B-12 IJ), Inject  as directed Every 30 (Thirty) Days., Disp: , Rfl:     ferrous sulfate 325 (65 FE) MG EC tablet, Take 1 tablet by mouth Daily With Breakfast., Disp: , Rfl:     furosemide (LASIX) 20 MG tablet, Take 1 tablet by mouth As Needed (Leg edema)., Disp: 30 tablet, Rfl: 11    gabapentin (NEURONTIN) 100 MG capsule, Take 1 capsule by mouth 2 (Two) Times a Day As Needed., Disp: , Rfl:     Inclisiran Sodium (LEQVIO SC), Inject  under the skin into the appropriate area as directed., Disp: , Rfl:     leflunomide (ARAVA) 10 MG tablet, Take 1 tablet by mouth 2 (Two) Times a Day., Disp: , Rfl:     levothyroxine (SYNTHROID, LEVOTHROID) 112 MCG tablet, Take 1 tablet by mouth Daily., Disp: , Rfl:     losartan (COZAAR) 100 MG tablet, Take 1 tablet by mouth Daily., Disp: 90 tablet, Rfl: 3    Multiple Vitamin (MULTI VITAMIN PO), Take  by mouth Daily., Disp: , Rfl:     NIFEdipine XL (PROCARDIA XL) 30 MG 24 hr tablet, Take 1 tablet by mouth Daily., Disp: 90 tablet, Rfl: 3    Probiotic Product (SOLUBLE FIBER/PROBIOTICS PO), Take 1 capsule by mouth Daily., Disp: , Rfl:     traZODone (DESYREL) 50 MG tablet, Take 0.25 tablets by mouth Every Night., Disp: , Rfl:     VITAMIN A PO, Take  by mouth., Disp: , Rfl:     vitamin D (ERGOCALCIFEROL) 01949 UNITS capsule capsule, Take 1 capsule by mouth Every 7 (Seven) Days., Disp: , Rfl:     Current Facility-Administered Medications:     Inclisiran Sodium solution prefilled syringe 284 mg, 284 mg, Subcutaneous, Q6 Months, Ja Bojorquez MD    Drug Interactions  None with Leqvio    Relevant Laboratory Values    Lipid Panel          11/14/2023    14:56   Lipid Panel   Total Cholesterol 174    Triglycerides 100    HDL Cholesterol 78    VLDL Cholesterol 18    LDL Cholesterol  78    LDL/HDL Ratio 0.97         Medication Assessment & Plan    Administered Leqvio 284mg SUBQ in the lower left side of her abdomen.   First dose: 9/27/23  Second  dose: 12/20/23  Medication education provided, including:   Common Adverse Effects: Injection site reactions, arthralgias, bronchitis, pain in extremity, and dyspnea.  Potential for allergic reaction.  Go to ED/call 911 with any S/Sx of allergic reaction.   Must be administered by a HCP.  If a dose is missed, please call to reschedule.   Expect LDL reduction, to help reduce cardiovascular risk.   At each visit, patient will need to stay a minimum of 15 min for monitoring   Most recent LDL was 78 on 11/14/23 after first dose of Leqvio. This has improved from an LDL of 171 on 2/16/23.  Patient will continue regular follow-up with cardiology.  Will follow up in 6 months for next injection.     Monisha Eng, PharmNICKI  12/20/2023  10:25 EST

## 2024-06-06 ENCOUNTER — TELEPHONE (OUTPATIENT)
Dept: PHARMACY | Facility: HOSPITAL | Age: 64
End: 2024-06-06
Payer: MEDICARE

## 2024-06-06 NOTE — TELEPHONE ENCOUNTER
Patient called in to cancel her Leqvio injection. She states she feels like her cholesterol is too low and she is going into surgery in July and does not want to get injection till she recovers. She will talk to Dr. Bojorquez at June 25 appt before rescheduling.

## 2024-06-25 ENCOUNTER — OFFICE VISIT (OUTPATIENT)
Dept: CARDIOLOGY | Facility: CLINIC | Age: 64
End: 2024-06-25
Payer: MEDICARE

## 2024-06-25 VITALS
WEIGHT: 192 LBS | HEIGHT: 68 IN | HEART RATE: 66 BPM | SYSTOLIC BLOOD PRESSURE: 160 MMHG | BODY MASS INDEX: 29.1 KG/M2 | DIASTOLIC BLOOD PRESSURE: 120 MMHG

## 2024-06-25 DIAGNOSIS — I35.1 AORTIC VALVE INSUFFICIENCY, ETIOLOGY OF CARDIAC VALVE DISEASE UNSPECIFIED: ICD-10-CM

## 2024-06-25 DIAGNOSIS — R06.02 SHORTNESS OF BREATH: ICD-10-CM

## 2024-06-25 DIAGNOSIS — G47.30 SLEEP APNEA IN ADULT: ICD-10-CM

## 2024-06-25 DIAGNOSIS — I27.20 PHT (PULMONARY HYPERTENSION): ICD-10-CM

## 2024-06-25 DIAGNOSIS — R60.0 BILATERAL LEG EDEMA: ICD-10-CM

## 2024-06-25 DIAGNOSIS — I10 ESSENTIAL HYPERTENSION: Primary | ICD-10-CM

## 2024-06-25 DIAGNOSIS — E78.00 HYPERCHOLESTEREMIA: ICD-10-CM

## 2024-06-25 PROCEDURE — 3077F SYST BP >= 140 MM HG: CPT | Performed by: INTERNAL MEDICINE

## 2024-06-25 PROCEDURE — 99214 OFFICE O/P EST MOD 30 MIN: CPT | Performed by: INTERNAL MEDICINE

## 2024-06-25 PROCEDURE — 1160F RVW MEDS BY RX/DR IN RCRD: CPT | Performed by: INTERNAL MEDICINE

## 2024-06-25 PROCEDURE — 3080F DIAST BP >= 90 MM HG: CPT | Performed by: INTERNAL MEDICINE

## 2024-06-25 PROCEDURE — 1159F MED LIST DOCD IN RCRD: CPT | Performed by: INTERNAL MEDICINE

## 2024-06-25 PROCEDURE — 93000 ELECTROCARDIOGRAM COMPLETE: CPT | Performed by: INTERNAL MEDICINE

## 2024-06-25 RX ORDER — CARVEDILOL 12.5 MG/1
12.5 TABLET ORAL 2 TIMES DAILY
Qty: 180 TABLET | Refills: 4 | Status: SHIPPED | OUTPATIENT
Start: 2024-06-25

## 2024-06-25 RX ORDER — NIFEDIPINE 60 MG/1
60 TABLET, EXTENDED RELEASE ORAL DAILY
Qty: 90 TABLET | Refills: 3 | Status: SHIPPED | OUTPATIENT
Start: 2024-06-25

## 2024-06-25 RX ORDER — LOSARTAN POTASSIUM 100 MG/1
100 TABLET ORAL DAILY
Qty: 90 TABLET | Refills: 3 | Status: SHIPPED | OUTPATIENT
Start: 2024-06-25

## 2024-06-25 RX ORDER — FUROSEMIDE 20 MG/1
20 TABLET ORAL AS NEEDED
Qty: 30 TABLET | Refills: 11 | Status: SHIPPED | OUTPATIENT
Start: 2024-06-25

## 2024-06-25 RX ORDER — LIDOCAINE 50 MG/G
1 PATCH TOPICAL DAILY PRN
COMMUNITY

## 2024-06-25 NOTE — PROGRESS NOTES
Chief Complaint   Patient presents with   • Follow-up     Cardiac management   • Lab     Last labs in chart.    • Cardiac clearance     Will need clearance for shoulder surgery on 7/11/24 with Dr Augusto Gonzalez at [facility] orthopedics. She is also asking if needs to continue Leqvio? LDL was 78, she had concerns due to having shoulder surgery.   • Shortness of Breath     Only notices when climbing steps or when out in the heat.    • Blood pressure     Is stable as long as she takes lasix daily. She did have steroid injection last evening.    • Med Refill     Will need refills on cardiac medications-90 day       CARDIAC COMPLAINTS  dyspnea and fatigue        Subjective   Yessenia Baxter is a 64 y.o. female came in today for her regular follow-up visit.  She has history of hypertension, hypercholesterolemia, aortic valvular disease and sleep apnea.  She also has history of connective tissue problem.  Her cholesterol has been elevated for a long time.  She was not able to tolerate any statin.  Finally Leqvio was started.  Her lipid panel showed total cholesterol was 174 and LDL of 78.  She apparently has undergone surgery on her knee and now scheduled to have shoulder surgery.  She needed clearance.  She had some questions about continuing the cholesterol medications.  She still has shortness of breath mostly when she climbs steps or out in the hot weather.  She has taken some steroid injection last evening and her blood pressure has been going up.  She has taken some steroid injection last evening and her blood pressure has been going up.  She also takes Lasix and the blood pressure goes very high.              Cardiac History  Past Surgical History:   Procedure Laterality Date   • CARDIOVASCULAR STRESS TEST  09/16/2015    R. Stress- ([facility]) 6 Min, 90% THR. BP- 204/100. Few PVC's. Negative    • CARDIOVASCULAR STRESS TEST  11/28/2016    @[facility]. 6 Min, 89%THR. BP- 210/106. Negative   • ECHO - CONVERTED  09/16/2015     Echo-  ([facility]) EF 60%. Mod AI. RVSP- 50 mmHg    • ECHO - CONVERTED  11/28/2016    @[facility]. EF 65%. Mild- Mod AI. RVSP-51 mmHg   • ECHO - CONVERTED  11/21/2017    @[facility]. EF 65%. Mild- Mod AI. Mild MR. RVSP- 54 mmHg   • ECHO - CONVERTED  03/29/2023    EF 65%.Mild-Mod AI, Mild MR. RVSP- 41 mmHg       Current Outpatient Medications   Medication Sig Dispense Refill   • Acetaminophen (TYLENOL ARTHRITIS PAIN PO) Take 1,000 mg by mouth Every Night.     • BLACK COHOSH PO Take  by mouth 2 (Two) Times a Day.     • carvedilol (COREG) 12.5 MG tablet Take 1 tablet by mouth 2 (Two) Times a Day. 180 tablet 4   • celecoxib (CeleBREX) 200 MG capsule Take 1 capsule by mouth Daily.     • Cyanocobalamin (VITAMIN B-12 IJ) Inject  as directed Every 14 (Fourteen) Days.     • ferrous sulfate 325 (65 FE) MG EC tablet Take 1 tablet by mouth Daily With Breakfast.     • furosemide (LASIX) 20 MG tablet Take 1 tablet by mouth As Needed (Leg edema). 30 tablet 11   • gabapentin (NEURONTIN) 100 MG capsule Take 1 capsule by mouth 2 (Two) Times a Day As Needed.     • Inclisiran Sodium (LEQVIO SC) Inject  under the skin into the appropriate area as directed.     • leflunomide (ARAVA) 10 MG tablet Take 2 tablets by mouth Daily.     • levothyroxine (SYNTHROID, LEVOTHROID) 112 MCG tablet Take 1 tablet by mouth Daily.     • lidocaine (LIDODERM) 5 % Place 1 patch on the skin as directed by provider Daily As Needed for Mild Pain. Remove & Discard patch within 12 hours or as directed by MD     • losartan (COZAAR) 100 MG tablet Take 1 tablet by mouth Daily. 90 tablet 3   • Multiple Vitamin (MULTI VITAMIN PO) Take  by mouth Daily.     • Probiotic Product (SOLUBLE FIBER/PROBIOTICS PO) Take 1 capsule by mouth Daily.     • traZODone (DESYREL) 50 MG tablet Take 0.25 tablets by mouth Every Night.     • vitamin D (ERGOCALCIFEROL) 85874 UNITS capsule capsule Take 1 capsule by mouth Every 7 (Seven) Days.     • NIFEdipine XL (Procardia XL) 60 MG 24 hr tablet Take 1 tablet by mouth  Daily. 90 tablet 3     No current facility-administered medications for this visit.       Allergies  :  Hydrochlorothiazide, Spironolactone, Aspirin, Zetia [ezetimibe], Demerol [meperidine], Latex, Other, Penicillins, Simvastatin, Tetracyclines & related, Crestor [rosuvastatin calcium], and Lipitor [atorvastatin]       Past Medical History:   Diagnosis Date   • Abnormal ECG 2014   • Arrhythmia 2014   • Arthritis    • Cyst removed from right hand    • Esophageal reflux    • Fibromyalgia    • H/O shoulder surgery     right   • H/O: hysterectomy    • Heart murmur 2014   • Heart valve disease 2014   • History of cholecystectomy    • Hypercholesterolemia    • Hypertension    • Hypothyroidism    • Left knee surgery twice    • Lupus     Followed rheumatologist   • RA (rheumatoid arthritis)     Bilateral Hip   • Sarcoidosis    • Sleep apnea     Does not wear CPAP       Social History     Socioeconomic History   • Marital status:    Tobacco Use   • Smoking status: Never     Passive exposure: Past   • Smokeless tobacco: Never   Vaping Use   • Vaping status: Never Used   Substance and Sexual Activity   • Alcohol use: No   • Drug use: No   • Sexual activity: Yes     Partners: Male     Birth control/protection: None       Family History   Problem Relation Age of Onset   • Stroke Mother 76   • Other Mother         History of A Fib.   • Anemia Mother    • Hypertension Mother    • Heart attack Father    • Heart failure Father    • Other Other          one at age 43 had AVR. one had stents at 67,    • Heart attack Maternal Grandfather         Massive heart attack   • Heart disease Brother         open heart surgery; valve replacement   • Hypertension Brother    • Heart disease Brother         Open heart surgery; MI   • Hyperlipidemia Brother    • Hypertension Brother    • Hypertension Sister        Review of Systems   Constitutional: Positive for malaise/fatigue. Negative for decreased appetite.   HENT:  Negative for  "congestion and sore throat.    Eyes:  Negative for blurred vision, double vision and visual disturbance.   Cardiovascular:  Positive for dyspnea on exertion and leg swelling. Negative for chest pain.   Respiratory:  Positive for shortness of breath. Negative for snoring.    Endocrine: Negative for cold intolerance and heat intolerance.   Hematologic/Lymphatic: Negative for adenopathy. Does not bruise/bleed easily.   Skin:  Negative for itching, nail changes and skin cancer.   Musculoskeletal:  Positive for arthritis and myalgias.   Gastrointestinal:  Negative for abdominal pain, dysphagia and heartburn.   Genitourinary:  Negative for bladder incontinence and frequency.   Neurological:  Negative for dizziness, seizures and vertigo.   Psychiatric/Behavioral:  Negative for altered mental status.    Allergic/Immunologic: Negative for environmental allergies and hives.     Diabetes- No  Thyroid- normal    Objective     BP (!) 160/120 (BP Location: Left arm, Patient Position: Sitting) Comment: has not took Lasix today  Pulse 66   Ht 172.7 cm (67.99\")   Wt 87.1 kg (192 lb)   BMI 29.20 kg/m²     Vitals and nursing note reviewed.   Constitutional:       Appearance: Healthy appearance. Not in distress.   Eyes:      Conjunctiva/sclera: Conjunctivae normal.      Pupils: Pupils are equal, round, and reactive to light.   HENT:      Head: Normocephalic.   Pulmonary:      Effort: Pulmonary effort is normal.      Breath sounds: Normal breath sounds.   Cardiovascular:      PMI at left midclavicular line. Normal rate. Regular rhythm.      Murmurs: There is a grade 3/4 high frequency blowing decrescendo, early diastolic murmur at the URSB, radiating to the apex.   Abdominal:      General: Bowel sounds are normal.      Palpations: Abdomen is soft.   Musculoskeletal: Normal range of motion.      Cervical back: Normal range of motion and neck supple. Skin:     General: Skin is warm and dry.   Neurological:      Mental Status: Alert, " oriented to person, place, and time and oriented to person, place and time.     ECG 12 Lead    Date/Time: 6/25/2024 1:33 PM  Performed by: Ja Bojorquez MD    Authorized by: Ja Bojorquez MD  Comparison: compared with previous ECG from 8/11/2015  Similar to previous ECG  Rhythm: sinus rhythm  Rate: normal  QRS axis: normal  Other findings: low voltage    Clinical impression: non-specific ECG              @ASSESSMENT/PLAN@  BMI is >= 25 and <30. (Overweight) The following options were offered after discussion;: exercise counseling/recommendations and nutrition counseling/recommendations     Diagnoses and all orders for this visit:    1. Essential hypertension (Primary)  -     carvedilol (COREG) 12.5 MG tablet; Take 1 tablet by mouth 2 (Two) Times a Day.  Dispense: 180 tablet; Refill: 4  -     losartan (COZAAR) 100 MG tablet; Take 1 tablet by mouth Daily.  Dispense: 90 tablet; Refill: 3  -     NIFEdipine XL (Procardia XL) 60 MG 24 hr tablet; Take 1 tablet by mouth Daily.  Dispense: 90 tablet; Refill: 3  -     Comprehensive Metabolic Panel; Future    2. Hypercholesteremia  -     Lipid Panel; Future    3. Aortic valve insufficiency, etiology of cardiac valve disease unspecified  -     CBC & Differential; Future    4. Shortness of breath    5. PHT (pulmonary hypertension)  -     Sedimentation Rate; Future    6. Sleep apnea in adult    7. Bilateral leg edema  -     furosemide (LASIX) 20 MG tablet; Take 1 tablet by mouth As Needed (Leg edema).  Dispense: 30 tablet; Refill: 11       At baseline her heart rate is stable.  Her blood pressure is moderately elevated.  Her EKG shows normal sinus rhythm, small QRS complex, no ST-T changes.  Her clinical examination reveals a BMI of around 29.  Her cardiovascular examination reveals early diastolic murmur at the aortic area.  She also has slightly loud second heart sound    Regarding her hypertension, it is still moderately elevated.  I advised her to continue the  Coreg and Cozaar.  I increased the dose of Procardia XL to 60 mg.  She is advised to check her electrolytes and renal function.    Regarding her hypercholesterolemia, I explained to her that only with the medicine her cholesterol is controlled.  If she wants she can take the medication after undergoing the shoulder surgery.    Regarding the aortic insufficiency is still about mild to moderate.  Her LV function is still normal and there is no dilatation.  Will continue to monitor    Regarding her shortness of breath, it is a combination of both metabolic syndrome and uncorrected sleep apnea.  Talked to her about diet and weight reduction    Regarding her pulmonary hypertension, it is being steady for the last few echoes.  Will continue to monitor    Regarding sleep apnea, she is still not using any CPAP    Regarding leg edema, continue Lasix as needed.    Regarding advanced directive, I gave her a booklet regarding living will and power of     Will see her back in 6 months or sooner if needed                  Electronically signed by Ja Bojorquez MD June 25, 2024 13:28 EDT

## 2024-06-25 NOTE — LETTER
June 25, 2024       No Recipients    Patient: Yessenia Baxter   YOB: 1960   Date of Visit: 6/25/2024     Dear Jaxson Hale MD:       Thank you for referring Yessenia Baxter to me for evaluation. Below are the relevant portions of my assessment and plan of care.    If you have questions, please do not hesitate to call me. I look forward to following Yessenia along with you.         Sincerely,        Ja Bojorquez MD        CC:   No Recipients    Ja Bojorquez MD  06/25/24 1333  Sign when Signing Visit  Chief Complaint   Patient presents with   • Follow-up     Cardiac management   • Lab     Last labs in chart.    • Cardiac clearance     Will need clearance for shoulder surgery on 7/11/24 with Dr Augusto Gonzalez at [facility]. She is also asking if needs to continue Leqvio? LDL was 78, she had concerns due to having shoulder surgery.   • Shortness of Breath     Only notices when climbing steps or when out in the heat.    • Blood pressure     Is stable as long as she takes lasix daily. She did have steroid injection last evening.    • Med Refill     Will need refills on cardiac medications-90 day       CARDIAC COMPLAINTS  dyspnea and fatigue        Subjective  Yessenia Baxter is a 64 y.o. female came in today for her regular follow-up visit.  She has history of hypertension, hypercholesterolemia, aortic valvular disease and sleep apnea.  She also has history of connective tissue problem.  Her cholesterol has been elevated for a long time.  She was not able to tolerate any statin.  Finally Leqvio was started.  Her lipid panel showed total cholesterol was 174 and LDL of 78.  She apparently has undergone surgery on her knee and now scheduled to have shoulder surgery.  She needed clearance.  She had some questions about continuing the cholesterol medications.  She still has shortness of breath mostly when she climbs steps or out in the hot weather.  She has taken some steroid injection last  evening and her blood pressure has been going up.  She has taken some steroid injection last evening and her blood pressure has been going up.  She also takes Lasix and the blood pressure goes very high.              Cardiac History  Past Surgical History:   Procedure Laterality Date   • CARDIOVASCULAR STRESS TEST  09/16/2015    R. Stress- ([facility]) 6 Min, 90% THR. BP- 204/100. Few PVC's. Negative    • CARDIOVASCULAR STRESS TEST  11/28/2016    @[facility]. 6 Min, 89%THR. BP- 210/106. Negative   • ECHO - CONVERTED  09/16/2015     Echo- ([facility]) EF 60%. Mod AI. RVSP- 50 mmHg    • ECHO - CONVERTED  11/28/2016    @[facility]. EF 65%. Mild- Mod AI. RVSP-51 mmHg   • ECHO - CONVERTED  11/21/2017    @[facility]. EF 65%. Mild- Mod AI. Mild MR. RVSP- 54 mmHg   • ECHO - CONVERTED  03/29/2023    EF 65%.Mild-Mod AI, Mild MR. RVSP- 41 mmHg       Current Outpatient Medications   Medication Sig Dispense Refill   • Acetaminophen (TYLENOL ARTHRITIS PAIN PO) Take 1,000 mg by mouth Every Night.     • BLACK COHOSH PO Take  by mouth 2 (Two) Times a Day.     • carvedilol (COREG) 12.5 MG tablet Take 1 tablet by mouth 2 (Two) Times a Day. 180 tablet 4   • celecoxib (CeleBREX) 200 MG capsule Take 1 capsule by mouth Daily.     • Cyanocobalamin (VITAMIN B-12 IJ) Inject  as directed Every 14 (Fourteen) Days.     • ferrous sulfate 325 (65 FE) MG EC tablet Take 1 tablet by mouth Daily With Breakfast.     • furosemide (LASIX) 20 MG tablet Take 1 tablet by mouth As Needed (Leg edema). 30 tablet 11   • gabapentin (NEURONTIN) 100 MG capsule Take 1 capsule by mouth 2 (Two) Times a Day As Needed.     • Inclisiran Sodium (LEQVIO SC) Inject  under the skin into the appropriate area as directed.     • leflunomide (ARAVA) 10 MG tablet Take 2 tablets by mouth Daily.     • levothyroxine (SYNTHROID, LEVOTHROID) 112 MCG tablet Take 1 tablet by mouth Daily.     • lidocaine (LIDODERM) 5 % Place 1 patch on the skin as directed by provider Daily As Needed for Mild Pain. Remove & Discard  patch within 12 hours or as directed by MD     • losartan (COZAAR) 100 MG tablet Take 1 tablet by mouth Daily. 90 tablet 3   • Multiple Vitamin (MULTI VITAMIN PO) Take  by mouth Daily.     • Probiotic Product (SOLUBLE FIBER/PROBIOTICS PO) Take 1 capsule by mouth Daily.     • traZODone (DESYREL) 50 MG tablet Take 0.25 tablets by mouth Every Night.     • vitamin D (ERGOCALCIFEROL) 61768 UNITS capsule capsule Take 1 capsule by mouth Every 7 (Seven) Days.     • NIFEdipine XL (Procardia XL) 60 MG 24 hr tablet Take 1 tablet by mouth Daily. 90 tablet 3     No current facility-administered medications for this visit.       Allergies  :  Hydrochlorothiazide, Spironolactone, Aspirin, Zetia [ezetimibe], Demerol [meperidine], Latex, Other, Penicillins, Simvastatin, Tetracyclines & related, Crestor [rosuvastatin calcium], and Lipitor [atorvastatin]       Past Medical History:   Diagnosis Date   • Abnormal ECG 2014   • Arrhythmia 2014   • Arthritis    • Cyst removed from right hand    • Esophageal reflux    • Fibromyalgia    • H/O shoulder surgery     right   • H/O: hysterectomy    • Heart murmur 2014   • Heart valve disease 2014   • History of cholecystectomy    • Hypercholesterolemia    • Hypertension    • Hypothyroidism    • Left knee surgery twice    • Lupus     Followed rheumatologist   • RA (rheumatoid arthritis)     Bilateral Hip   • Sarcoidosis    • Sleep apnea     Does not wear CPAP       Social History     Socioeconomic History   • Marital status:    Tobacco Use   • Smoking status: Never     Passive exposure: Past   • Smokeless tobacco: Never   Vaping Use   • Vaping status: Never Used   Substance and Sexual Activity   • Alcohol use: No   • Drug use: No   • Sexual activity: Yes     Partners: Male     Birth control/protection: None       Family History   Problem Relation Age of Onset   • Stroke Mother 76   • Other Mother         History of A Fib.   • Anemia Mother    • Hypertension Mother    • Heart attack Father   "  • Heart failure Father    • Other Other          one at age 43 had AVR. one had stents at 67,    • Heart attack Maternal Grandfather         Massive heart attack   • Heart disease Brother         open heart surgery; valve replacement   • Hypertension Brother    • Heart disease Brother         Open heart surgery; MI   • Hyperlipidemia Brother    • Hypertension Brother    • Hypertension Sister        Review of Systems   Constitutional: Positive for malaise/fatigue. Negative for decreased appetite.   HENT:  Negative for congestion and sore throat.    Eyes:  Negative for blurred vision, double vision and visual disturbance.   Cardiovascular:  Positive for dyspnea on exertion and leg swelling. Negative for chest pain.   Respiratory:  Positive for shortness of breath. Negative for snoring.    Endocrine: Negative for cold intolerance and heat intolerance.   Hematologic/Lymphatic: Negative for adenopathy. Does not bruise/bleed easily.   Skin:  Negative for itching, nail changes and skin cancer.   Musculoskeletal:  Positive for arthritis and myalgias.   Gastrointestinal:  Negative for abdominal pain, dysphagia and heartburn.   Genitourinary:  Negative for bladder incontinence and frequency.   Neurological:  Negative for dizziness, seizures and vertigo.   Psychiatric/Behavioral:  Negative for altered mental status.    Allergic/Immunologic: Negative for environmental allergies and hives.     Diabetes- No  Thyroid- normal    Objective    BP (!) 160/120 (BP Location: Left arm, Patient Position: Sitting) Comment: has not took Lasix today  Pulse 66   Ht 172.7 cm (67.99\")   Wt 87.1 kg (192 lb)   BMI 29.20 kg/m²     Vitals and nursing note reviewed.   Constitutional:       Appearance: Healthy appearance. Not in distress.   Eyes:      Conjunctiva/sclera: Conjunctivae normal.      Pupils: Pupils are equal, round, and reactive to light.   HENT:      Head: Normocephalic.   Pulmonary:      Effort: Pulmonary effort is normal.      " Breath sounds: Normal breath sounds.   Cardiovascular:      PMI at left midclavicular line. Normal rate. Regular rhythm.      Murmurs: There is a grade 3/4 high frequency blowing decrescendo, early diastolic murmur at the URSB, radiating to the apex.   Abdominal:      General: Bowel sounds are normal.      Palpations: Abdomen is soft.   Musculoskeletal: Normal range of motion.      Cervical back: Normal range of motion and neck supple. Skin:     General: Skin is warm and dry.   Neurological:      Mental Status: Alert, oriented to person, place, and time and oriented to person, place and time.     ECG 12 Lead    Date/Time: 6/25/2024 1:33 PM  Performed by: Ja Bojorquez MD    Authorized by: Ja Bojorquez MD  Comparison: compared with previous ECG from 8/11/2015  Similar to previous ECG  Rhythm: sinus rhythm  Rate: normal  QRS axis: normal  Other findings: low voltage    Clinical impression: non-specific ECG              @ASSESSMENT/PLAN@  BMI is >= 25 and <30. (Overweight) The following options were offered after discussion;: exercise counseling/recommendations and nutrition counseling/recommendations     Diagnoses and all orders for this visit:    1. Essential hypertension (Primary)  -     carvedilol (COREG) 12.5 MG tablet; Take 1 tablet by mouth 2 (Two) Times a Day.  Dispense: 180 tablet; Refill: 4  -     losartan (COZAAR) 100 MG tablet; Take 1 tablet by mouth Daily.  Dispense: 90 tablet; Refill: 3  -     NIFEdipine XL (Procardia XL) 60 MG 24 hr tablet; Take 1 tablet by mouth Daily.  Dispense: 90 tablet; Refill: 3  -     Comprehensive Metabolic Panel; Future    2. Hypercholesteremia  -     Lipid Panel; Future    3. Aortic valve insufficiency, etiology of cardiac valve disease unspecified  -     CBC & Differential; Future    4. Shortness of breath    5. PHT (pulmonary hypertension)  -     Sedimentation Rate; Future    6. Sleep apnea in adult    7. Bilateral leg edema  -     furosemide (LASIX) 20 MG  tablet; Take 1 tablet by mouth As Needed (Leg edema).  Dispense: 30 tablet; Refill: 11       At baseline her heart rate is stable.  Her blood pressure is moderately elevated.  Her EKG shows normal sinus rhythm, small QRS complex, no ST-T changes.  Her clinical examination reveals a BMI of around 29.  Her cardiovascular examination reveals early diastolic murmur at the aortic area.  She also has slightly loud second heart sound    Regarding her hypertension, it is still moderately elevated.  I advised her to continue the Coreg and Cozaar.  I increased the dose of Procardia XL to 60 mg.  She is advised to check her electrolytes and renal function.    Regarding her hypercholesterolemia, I explained to her that only with the medicine her cholesterol is controlled.  If she wants she can take the medication after undergoing the shoulder surgery.    Regarding the aortic insufficiency is still about mild to moderate.  Her LV function is still normal and there is no dilatation.  Will continue to monitor    Regarding her shortness of breath, it is a combination of both metabolic syndrome and uncorrected sleep apnea.  Talked to her about diet and weight reduction    Regarding her pulmonary hypertension, it is being steady for the last few echoes.  Will continue to monitor    Regarding sleep apnea, she is still not using any CPAP    Regarding leg edema, continue Lasix as needed.    Regarding advanced directive, I gave her a booklet regarding living will and power of     Will see her back in 6 months or sooner if needed                  Electronically signed by Ja Bojorquez MD June 25, 2024 13:28 EDT

## 2024-07-02 ENCOUNTER — TELEPHONE (OUTPATIENT)
Dept: CARDIOLOGY | Facility: CLINIC | Age: 64
End: 2024-07-02
Payer: MEDICARE

## 2024-07-02 NOTE — TELEPHONE ENCOUNTER
Received fax from Dr. Gonzalez for cardiac clearance for patient to have right shoulder with revision RTC repair and dermal allograft. According to our records, I do not see where patient is on any blood thinners or has had any stenting.          Fax [phone]

## 2024-08-09 ENCOUNTER — SPECIALTY PHARMACY (OUTPATIENT)
Dept: PHARMACY | Facility: HOSPITAL | Age: 64
End: 2024-08-09
Payer: MEDICARE

## 2024-08-09 NOTE — PROGRESS NOTES
Specialty Pharmacy Refill Coordination Note     Yessenia is a 64 y.o. female contacted today regarding refills of  Leqvio specialty medication(s).    Reviewed and verified with patient:       Specialty medication(s) and dose(s) confirmed: yes    Refill Questions      Flowsheet Row Most Recent Value   Changes to allergies? No   Changes to medications? No   New conditions or infections since last clinic visit No   Unplanned office visit, urgent care, ED, or hospital admission in the last 4 weeks  No   How does patient/caregiver feel medication is working? Very good   Financial problems or insurance changes  No   Since the previous refill, were any specialty medication doses or scheduled injections missed or delayed?  No   Does this patient require a clinical escalation to a pharmacist? No            Delivery Questions      Flowsheet Row Most Recent Value   Copay verified? Yes   Copay amount 0   Copay form of payment No copayment ($0)                   Follow-up: 180 day(s)     Sangeetha Mcbride, Pharmacy Technician  Specialty Pharmacy Technician

## 2024-08-16 ENCOUNTER — HOSPITAL ENCOUNTER (OUTPATIENT)
Dept: CARDIOLOGY | Facility: HOSPITAL | Age: 64
Discharge: HOME | End: 2024-08-16
Payer: MEDICARE

## 2024-08-16 ENCOUNTER — SPECIALTY PHARMACY (OUTPATIENT)
Dept: PHARMACY | Facility: HOSPITAL | Age: 64
End: 2024-08-16
Payer: MEDICARE

## 2024-08-16 ENCOUNTER — DISEASE STATE MANAGEMENT VISIT (OUTPATIENT)
Dept: PHARMACY | Facility: HOSPITAL | Age: 64
End: 2024-08-16
Payer: MEDICARE

## 2024-08-16 DIAGNOSIS — E78.00 HYPERCHOLESTEREMIA: Primary | ICD-10-CM

## 2024-08-16 PROCEDURE — 25010000002 INCLISIRAN SODIUM 284 MG/1.5ML SOLUTION PREFILLED SYRINGE: Performed by: INTERNAL MEDICINE

## 2024-08-16 PROCEDURE — 96372 THER/PROPH/DIAG INJ SC/IM: CPT | Performed by: PHARMACIST

## 2024-08-16 RX ORDER — TRAMADOL HYDROCHLORIDE 50 MG/1
50 TABLET ORAL DAILY PRN
COMMUNITY

## 2024-08-16 RX ADMIN — INCLISIRAN 284 MG: 284 INJECTION, SOLUTION SUBCUTANEOUS at 12:49

## 2024-08-16 NOTE — PROGRESS NOTES
[facility]  Lipid Management Program - Leqvio (Inclisiran)     Yessenia Baxter is a 64 y.o. female referred to the [facility] by Dr. Bojorquez for clinical pharmacy and specialty pharmacy management of Inclisiran.  Yessenia Baxter is  treated for hyperlipidemia and currently takes Leqvio for cholesterol.  In the past, Pt has tried Lipitor, Crestor, and Zetia which all caused myalgias.  She has also tried Praluent (before COVID) but could not afford.     Patient reports tolerating previous injection well. She denies any adverse effects.     Relevant Past Medical History and Comorbidities  Past Medical History:   Diagnosis Date    Abnormal ECG 2014    Arrhythmia 2014    Arthritis     Cyst removed from right hand     Esophageal reflux     Fibromyalgia     H/O shoulder surgery     right    H/O: hysterectomy     Heart murmur 2014    Heart valve disease 2014    History of cholecystectomy     Hypercholesterolemia     Hypertension     Hypothyroidism     Left knee surgery twice     Lupus     Followed rheumatologist    RA (rheumatoid arthritis)     Bilateral Hip    Sarcoidosis     Sleep apnea     Does not wear CPAP     Social History     Socioeconomic History    Marital status:    Tobacco Use    Smoking status: Never     Passive exposure: Past    Smokeless tobacco: Never   Vaping Use    Vaping status: Never Used   Substance and Sexual Activity    Alcohol use: No    Drug use: No    Sexual activity: Yes     Partners: Male     Birth control/protection: None       Allergies  Hydrochlorothiazide, Spironolactone, Aspirin, Zetia [ezetimibe], Demerol [meperidine], Latex, Other, Penicillins, Simvastatin, Tetracyclines & related, Crestor [rosuvastatin calcium], and Lipitor [atorvastatin]    Current Medication List    Current Outpatient Medications:     Acetaminophen (TYLENOL ARTHRITIS PAIN PO), Take 1,000 mg by mouth Every Night., Disp: , Rfl:     BLACK COHOSH PO, Take  by mouth 2 (Two) Times a  Day., Disp: , Rfl:     carvedilol (COREG) 12.5 MG tablet, Take 1 tablet by mouth 2 (Two) Times a Day., Disp: 180 tablet, Rfl: 4    celecoxib (CeleBREX) 200 MG capsule, Take 1 capsule by mouth Daily., Disp: , Rfl:     Cyanocobalamin (VITAMIN B-12 IJ), Inject  as directed Every 14 (Fourteen) Days., Disp: , Rfl:     ferrous sulfate 325 (65 FE) MG EC tablet, Take 1 tablet by mouth Daily With Breakfast., Disp: , Rfl:     furosemide (LASIX) 20 MG tablet, Take 1 tablet by mouth As Needed (Leg edema)., Disp: 30 tablet, Rfl: 11    gabapentin (NEURONTIN) 100 MG capsule, Take 1 capsule by mouth 2 (Two) Times a Day As Needed., Disp: , Rfl:     Inclisiran Sodium (LEQVIO SC), Inject  under the skin into the appropriate area as directed., Disp: , Rfl:     leflunomide (ARAVA) 10 MG tablet, Take 2 tablets by mouth Daily., Disp: , Rfl:     levothyroxine (SYNTHROID, LEVOTHROID) 112 MCG tablet, Take 1 tablet by mouth Daily., Disp: , Rfl:     lidocaine (LIDODERM) 5 %, Place 1 patch on the skin as directed by provider Daily As Needed for Mild Pain. Remove & Discard patch within 12 hours or as directed by MD, Disp: , Rfl:     losartan (COZAAR) 100 MG tablet, Take 1 tablet by mouth Daily., Disp: 90 tablet, Rfl: 3    Multiple Vitamin (MULTI VITAMIN PO), Take  by mouth Daily., Disp: , Rfl:     NIFEdipine XL (Procardia XL) 60 MG 24 hr tablet, Take 1 tablet by mouth Daily., Disp: 90 tablet, Rfl: 3    Probiotic Product (SOLUBLE FIBER/PROBIOTICS PO), Take 1 capsule by mouth Daily., Disp: , Rfl:     traMADol (ULTRAM) 50 MG tablet, Take 1 tablet by mouth Daily As Needed for Moderate Pain., Disp: , Rfl:     traZODone (DESYREL) 50 MG tablet, Take 0.25 tablets by mouth Every Night., Disp: , Rfl:     vitamin D (ERGOCALCIFEROL) 84509 UNITS capsule capsule, Take 1 capsule by mouth Every 7 (Seven) Days., Disp: , Rfl:   No current facility-administered medications for this visit.    Drug Interactions  None with Leqvio    Relevant Laboratory  Values    Lipid Panel          11/14/2023    14:56   Lipid Panel   Total Cholesterol 174    Triglycerides 100    HDL Cholesterol 78    VLDL Cholesterol 18    LDL Cholesterol  78    LDL/HDL Ratio 0.97         Medication Assessment & Plan    Luisa Olson, PharmD Candidate Administered Leqvio 284mg SUBQ in the lower left side of her abdomen.   First dose: 9/27/23  Second dose: 12/20/23  Third Dose: 8/16/24  Medication education provided at initial visit.   Most recent LDL was 103. This has improved from an LDL of 171 on 2/16/23.  Patient will continue regular follow-up with cardiology.  Will follow up in 6 months for next injection.     Gala Ramos, Edgardo  8/16/2024  12:52 EDT

## 2024-08-16 NOTE — PROGRESS NOTES
[facility]  Lipid Management Program - Leqvio (Inclisiran)     Yessenia Baxter is a 64 y.o. female referred to the [facility] by Dr. Bojorquez for clinical pharmacy and specialty pharmacy management of Inclisiran.  Yessenia Baxter is  treated for hyperlipidemia and currently takes Leqvio for cholesterol.  In the past, Pt has tried Lipitor, Crestor, and Zetia which all caused myalgias.  She has also tried Praluent (before COVID) but could not afford.     Patient reports tolerating previous injection well. She denies any adverse effects.     Relevant Past Medical History and Comorbidities  Past Medical History:   Diagnosis Date    Abnormal ECG 2014    Arrhythmia 2014    Arthritis     Cyst removed from right hand     Esophageal reflux     Fibromyalgia     H/O shoulder surgery     right    H/O: hysterectomy     Heart murmur 2014    Heart valve disease 2014    History of cholecystectomy     Hypercholesterolemia     Hypertension     Hypothyroidism     Left knee surgery twice     Lupus     Followed rheumatologist    RA (rheumatoid arthritis)     Bilateral Hip    Sarcoidosis     Sleep apnea     Does not wear CPAP     Social History     Socioeconomic History    Marital status:    Tobacco Use    Smoking status: Never     Passive exposure: Past    Smokeless tobacco: Never   Vaping Use    Vaping status: Never Used   Substance and Sexual Activity    Alcohol use: No    Drug use: No    Sexual activity: Yes     Partners: Male     Birth control/protection: None       Allergies  Hydrochlorothiazide, Spironolactone, Aspirin, Zetia [ezetimibe], Demerol [meperidine], Latex, Other, Penicillins, Simvastatin, Tetracyclines & related, Crestor [rosuvastatin calcium], and Lipitor [atorvastatin]    Current Medication List    Current Outpatient Medications:     Acetaminophen (TYLENOL ARTHRITIS PAIN PO), Take 1,000 mg by mouth Every Night., Disp: , Rfl:     BLACK COHOSH PO, Take  by mouth 2 (Two) Times a  Day., Disp: , Rfl:     carvedilol (COREG) 12.5 MG tablet, Take 1 tablet by mouth 2 (Two) Times a Day., Disp: 180 tablet, Rfl: 4    celecoxib (CeleBREX) 200 MG capsule, Take 1 capsule by mouth Daily., Disp: , Rfl:     Cyanocobalamin (VITAMIN B-12 IJ), Inject  as directed Every 14 (Fourteen) Days., Disp: , Rfl:     ferrous sulfate 325 (65 FE) MG EC tablet, Take 1 tablet by mouth Daily With Breakfast., Disp: , Rfl:     furosemide (LASIX) 20 MG tablet, Take 1 tablet by mouth As Needed (Leg edema)., Disp: 30 tablet, Rfl: 11    gabapentin (NEURONTIN) 100 MG capsule, Take 1 capsule by mouth 2 (Two) Times a Day As Needed., Disp: , Rfl:     Inclisiran Sodium (LEQVIO SC), Inject  under the skin into the appropriate area as directed., Disp: , Rfl:     leflunomide (ARAVA) 10 MG tablet, Take 2 tablets by mouth Daily., Disp: , Rfl:     levothyroxine (SYNTHROID, LEVOTHROID) 112 MCG tablet, Take 1 tablet by mouth Daily., Disp: , Rfl:     lidocaine (LIDODERM) 5 %, Place 1 patch on the skin as directed by provider Daily As Needed for Mild Pain. Remove & Discard patch within 12 hours or as directed by MD, Disp: , Rfl:     losartan (COZAAR) 100 MG tablet, Take 1 tablet by mouth Daily., Disp: 90 tablet, Rfl: 3    Multiple Vitamin (MULTI VITAMIN PO), Take  by mouth Daily., Disp: , Rfl:     NIFEdipine XL (Procardia XL) 60 MG 24 hr tablet, Take 1 tablet by mouth Daily., Disp: 90 tablet, Rfl: 3    Probiotic Product (SOLUBLE FIBER/PROBIOTICS PO), Take 1 capsule by mouth Daily., Disp: , Rfl:     traMADol (ULTRAM) 50 MG tablet, Take 1 tablet by mouth Daily As Needed for Moderate Pain., Disp: , Rfl:     traZODone (DESYREL) 50 MG tablet, Take 0.25 tablets by mouth Every Night., Disp: , Rfl:     vitamin D (ERGOCALCIFEROL) 93098 UNITS capsule capsule, Take 1 capsule by mouth Every 7 (Seven) Days., Disp: , Rfl:     Drug Interactions  None with Leqvio    Relevant Laboratory Values    Lipid Panel          11/14/2023    14:56   Lipid Panel   Total  Cholesterol 174    Triglycerides 100    HDL Cholesterol 78    VLDL Cholesterol 18    LDL Cholesterol  78    LDL/HDL Ratio 0.97         Medication Assessment & Plan    Luisa Olson, PharmD Candidate Administered Leqvio 284mg SUBQ in the lower left side of her abdomen.   First dose: 9/27/23  Second dose: 12/20/23  Third Dose: 8/16/24  Medication education provided at initial visit.   Most recent LDL was 103. This has improved from an LDL of 171 on 2/16/23.  Patient will continue regular follow-up with cardiology.  Will follow up in 6 months for next injection.     Gala Ramos, PharmD  8/16/2024  12:28 EDT

## 2025-02-10 ENCOUNTER — SPECIALTY PHARMACY (OUTPATIENT)
Dept: PHARMACY | Facility: HOSPITAL | Age: 65
End: 2025-02-10
Payer: MEDICARE

## 2025-02-10 NOTE — PROGRESS NOTES
Specialty Pharmacy Refill Coordination Note     Yessenia is a 65 y.o. female contacted today regarding refills of  Leqvio specialty medication(s).    Reviewed and verified with patient:       Specialty medication(s) and dose(s) confirmed: yes    Refill Questions      Flowsheet Row Most Recent Value   Changes to allergies? No   Changes to medications? No   New conditions or infections since last clinic visit No   Unplanned office visit, urgent care, ED, or hospital admission in the last 4 weeks  No   How does patient/caregiver feel medication is working? Good   Financial problems or insurance changes  No   Since the previous refill, were any specialty medication doses or scheduled injections missed or delayed?  No   Does this patient require a clinical escalation to a pharmacist? No            Delivery Questions      Flowsheet Row Most Recent Value   Delivery method  at Pharmacy   Delivery address Prescription   Medication(s) being filled and delivered --  [Leqvio]   Copay verified? Yes   Copay amount $0   Copay form of payment No copayment ($0)   Signature Required No                   Follow-up: 180 day(s)     Sangeetha Mcbride, Pharmacy Technician  Specialty Pharmacy Technician

## 2025-02-14 ENCOUNTER — SPECIALTY PHARMACY (OUTPATIENT)
Dept: CARDIOLOGY | Facility: HOSPITAL | Age: 65
End: 2025-02-14
Payer: MEDICARE

## 2025-02-14 ENCOUNTER — HOSPITAL ENCOUNTER (OUTPATIENT)
Dept: CARDIOLOGY | Facility: HOSPITAL | Age: 65
Discharge: HOME | End: 2025-02-14
Payer: MEDICARE

## 2025-02-14 PROCEDURE — 96372 THER/PROPH/DIAG INJ SC/IM: CPT

## 2025-02-14 PROCEDURE — 25010000002 INCLISIRAN SODIUM 284 MG/1.5ML SOLUTION PREFILLED SYRINGE: Performed by: INTERNAL MEDICINE

## 2025-02-14 RX ORDER — INCLISIRAN 284 MG/1.5ML
284 INJECTION, SOLUTION SUBCUTANEOUS
COMMUNITY

## 2025-02-14 RX ORDER — INCLISIRAN 284 MG/1.5ML
284 INJECTION, SOLUTION SUBCUTANEOUS ONCE
Qty: 1.5 ML | Refills: 0 | Status: SHIPPED | OUTPATIENT
Start: 2025-02-14 | End: 2025-02-14

## 2025-02-14 RX ADMIN — INCLISIRAN 284 MG: 284 INJECTION, SOLUTION SUBCUTANEOUS at 12:17

## 2025-02-14 NOTE — PROGRESS NOTES
[facility]  Lipid Management Program - Leqvio (Inclisiran)     Yessenia Baxter  is a 65 y.o. female referred by their provider, Dr. Bojorquez, to the Hyperlipidemia Patient Management program offered by [facility] for Lipid Management.  Yessenia Baxter is treated for hyperlipidemia and currently takes Leqvio for cholesterol.  In the past, Pt has tried Lipitor, Crestor, and Zetia which all caused myalgias.  She has also tried Praluent (before COVID) but could not afford.      Patient reports tolerating previous injection well. She denies any adverse effects.     Drug Coverage   Medical Bill    Relevant Past Medical History and Comorbidities  Past Medical History:   Diagnosis Date    Abnormal ECG 2014    Arrhythmia 2014    Arthritis     Cyst removed from right hand     Esophageal reflux     Fibromyalgia     H/O shoulder surgery     right    H/O: hysterectomy     Heart murmur 2014    Heart valve disease 2014    History of cholecystectomy     Hypercholesterolemia     Hypertension     Hypothyroidism     Left knee surgery twice     Lupus     Followed rheumatologist    RA (rheumatoid arthritis)     Bilateral Hip    Sarcoidosis     Sleep apnea     Does not wear CPAP     Social History     Socioeconomic History    Marital status:    Tobacco Use    Smoking status: Never     Passive exposure: Past    Smokeless tobacco: Never   Vaping Use    Vaping status: Never Used   Substance and Sexual Activity    Alcohol use: No    Drug use: No    Sexual activity: Yes     Partners: Male     Birth control/protection: None         Allergies  Known allergies and reactions were discussed with the patient. The patient's chart has been reviewed for  allergy information and updated as necessary.   Allergies   Allergen Reactions    Hydrochlorothiazide Anaphylaxis    Spironolactone Other (See Comments)     Abdominal cramping, diarrhea, tongue thickness, feet and legs turned a dark pink.     Aspirin Other (See Comments)     nosebleeds    Zetia [Ezetimibe] Myalgia    Demerol [Meperidine]     Latex Other (See Comments)     blisters    Other      hydrochlorothiazide: anaphylactic    Penicillins     Simvastatin Myalgia    Tetracyclines & Related     Crestor [Rosuvastatin Calcium] Myalgia    Lipitor [Atorvastatin] Myalgia       Relevant Laboratory Values  Lab Results   Component Value Date    CHOL 174 11/14/2023    TRIG 100 11/14/2023    HDL 78 (H) 11/14/2023    LDL 78 11/14/2023       Current Medication List    Current Outpatient Medications:     Acetaminophen (TYLENOL ARTHRITIS PAIN PO), Take 1,000 mg by mouth Every Night., Disp: , Rfl:     BLACK COHOSH PO, Take  by mouth 2 (Two) Times a Day., Disp: , Rfl:     carvedilol (COREG) 12.5 MG tablet, Take 1 tablet by mouth 2 (Two) Times a Day., Disp: 180 tablet, Rfl: 4    Cyanocobalamin (VITAMIN B-12 IJ), Inject  as directed Every 14 (Fourteen) Days., Disp: , Rfl:     ferrous sulfate 325 (65 FE) MG EC tablet, Take 1 tablet by mouth Daily With Breakfast., Disp: , Rfl:     furosemide (LASIX) 20 MG tablet, Take 1 tablet by mouth As Needed (Leg edema)., Disp: 30 tablet, Rfl: 11    gabapentin (NEURONTIN) 100 MG capsule, Take 1 capsule by mouth 2 (Two) Times a Day As Needed., Disp: , Rfl:     Inclisiran Sodium (LEQVIO SC), Inject  under the skin into the appropriate area as directed., Disp: , Rfl:     leflunomide (ARAVA) 10 MG tablet, Take 2 tablets by mouth Daily., Disp: , Rfl:     levothyroxine (SYNTHROID, LEVOTHROID) 112 MCG tablet, Take 1 tablet by mouth Daily., Disp: , Rfl:     lidocaine (LIDODERM) 5 %, Place 1 patch on the skin as directed by provider Daily As Needed for Mild Pain. Remove & Discard patch within 12 hours or as directed by MD, Disp: , Rfl:     losartan (COZAAR) 100 MG tablet, Take 1 tablet by mouth Daily., Disp: 90 tablet, Rfl: 3    Multiple Vitamin (MULTI VITAMIN PO), Take  by mouth Daily., Disp: , Rfl:     NIFEdipine XL (Procardia XL) 60 MG 24  hr tablet, Take 1 tablet by mouth Daily., Disp: 90 tablet, Rfl: 3    Probiotic Product (SOLUBLE FIBER/PROBIOTICS PO), Take 1 capsule by mouth Daily., Disp: , Rfl:     traMADol (ULTRAM) 50 MG tablet, Take 1 tablet by mouth Daily As Needed for Moderate Pain., Disp: , Rfl:     traZODone (DESYREL) 50 MG tablet, Take 0.25 tablets by mouth Every Night., Disp: , Rfl:     vitamin D (ERGOCALCIFEROL) 15576 UNITS capsule capsule, Take 1 capsule by mouth Every 7 (Seven) Days., Disp: , Rfl:     Medicines reviewed by Monisha Eng, PharmD on 2/14/2025 at 12:06 PM    Drug Interactions  None with Leqvio    Goals of Therapy  LDL Reduction     Medication Assessment & Plan  Patient will continue Leqvio 284mg SC every 6 months.  Robyn Borden (PharmD candidate) administered Leqvio 284mg SUBQ in right side of abdomen.  First dose: 9/27/23  Second dose: 12/20/23  Third Dose: 8/16/24  Fourth dose: 2/14/25 (Lot [id number], Exp 5/31/26)  Medication education provided at initial appointment.  Most recent LDL was 40 on 12/2/24.  Patient will continue regular follow-up with cardiology  Will follow up in 6 months for next injection.     Monisha Eng PharmD  2/14/2025  12:10 EST

## 2025-02-14 NOTE — PROGRESS NOTES
[facility]  Lipid Management Program - Leqvio (Inclisiran)     Yessenia Baxter  is a 65 y.o. female referred by their provider, Dr. Bojorquez, to the Hyperlipidemia Patient Management program offered by [facility] for Lipid Management.  Yessenia Baxter is treated for hyperlipidemia and currently takes Leqvio for cholesterol.  In the past, Pt has tried Lipitor, Crestor, and Zetia which all caused myalgias.  She has also tried Praluent (before COVID) but could not afford.      Patient reports tolerating previous injection well. She denies any adverse effects.     Drug Coverage   Medical Bill    Relevant Past Medical History and Comorbidities  Past Medical History:   Diagnosis Date    Abnormal ECG 2014    Arrhythmia 2014    Arthritis     Cyst removed from right hand     Esophageal reflux     Fibromyalgia     H/O shoulder surgery     right    H/O: hysterectomy     Heart murmur 2014    Heart valve disease 2014    History of cholecystectomy     Hypercholesterolemia     Hypertension     Hypothyroidism     Left knee surgery twice     Lupus     Followed rheumatologist    RA (rheumatoid arthritis)     Bilateral Hip    Sarcoidosis     Sleep apnea     Does not wear CPAP     Social History     Socioeconomic History    Marital status:    Tobacco Use    Smoking status: Never     Passive exposure: Past    Smokeless tobacco: Never   Vaping Use    Vaping status: Never Used   Substance and Sexual Activity    Alcohol use: No    Drug use: No    Sexual activity: Yes     Partners: Male     Birth control/protection: None         Allergies  Known allergies and reactions were discussed with the patient. The patient's chart has been reviewed for  allergy information and updated as necessary.   Allergies   Allergen Reactions    Hydrochlorothiazide Anaphylaxis    Spironolactone Other (See Comments)     Abdominal cramping, diarrhea, tongue thickness, feet and legs turned a dark pink.     Aspirin Other (See Comments)     nosebleeds    Zetia [Ezetimibe] Myalgia    Demerol [Meperidine]     Latex Other (See Comments)     blisters    Other      hydrochlorothiazide: anaphylactic    Penicillins     Simvastatin Myalgia    Tetracyclines & Related     Crestor [Rosuvastatin Calcium] Myalgia    Lipitor [Atorvastatin] Myalgia       Relevant Laboratory Values  Lab Results   Component Value Date    CHOL 174 11/14/2023    TRIG 100 11/14/2023    HDL 78 (H) 11/14/2023    LDL 78 11/14/2023       Current Medication List    Current Outpatient Medications:     Acetaminophen (TYLENOL ARTHRITIS PAIN PO), Take 1,000 mg by mouth Every Night., Disp: , Rfl:     BLACK COHOSH PO, Take  by mouth 2 (Two) Times a Day., Disp: , Rfl:     carvedilol (COREG) 12.5 MG tablet, Take 1 tablet by mouth 2 (Two) Times a Day., Disp: 180 tablet, Rfl: 4    Cyanocobalamin (VITAMIN B-12 IJ), Inject  as directed Every 14 (Fourteen) Days., Disp: , Rfl:     ferrous sulfate 325 (65 FE) MG EC tablet, Take 1 tablet by mouth Daily With Breakfast., Disp: , Rfl:     furosemide (LASIX) 20 MG tablet, Take 1 tablet by mouth As Needed (Leg edema)., Disp: 30 tablet, Rfl: 11    gabapentin (NEURONTIN) 100 MG capsule, Take 1 capsule by mouth 2 (Two) Times a Day As Needed., Disp: , Rfl:     Inclisiran Sodium (LEQVIO SC), Inject  under the skin into the appropriate area as directed., Disp: , Rfl:     leflunomide (ARAVA) 10 MG tablet, Take 2 tablets by mouth Daily., Disp: , Rfl:     levothyroxine (SYNTHROID, LEVOTHROID) 112 MCG tablet, Take 1 tablet by mouth Daily., Disp: , Rfl:     lidocaine (LIDODERM) 5 %, Place 1 patch on the skin as directed by provider Daily As Needed for Mild Pain. Remove & Discard patch within 12 hours or as directed by MD, Disp: , Rfl:     losartan (COZAAR) 100 MG tablet, Take 1 tablet by mouth Daily., Disp: 90 tablet, Rfl: 3    Multiple Vitamin (MULTI VITAMIN PO), Take  by mouth Daily., Disp: , Rfl:     NIFEdipine XL (Procardia XL) 60 MG 24  hr tablet, Take 1 tablet by mouth Daily., Disp: 90 tablet, Rfl: 3    Probiotic Product (SOLUBLE FIBER/PROBIOTICS PO), Take 1 capsule by mouth Daily., Disp: , Rfl:     traMADol (ULTRAM) 50 MG tablet, Take 1 tablet by mouth Daily As Needed for Moderate Pain., Disp: , Rfl:     traZODone (DESYREL) 50 MG tablet, Take 0.25 tablets by mouth Every Night., Disp: , Rfl:     vitamin D (ERGOCALCIFEROL) 61628 UNITS capsule capsule, Take 1 capsule by mouth Every 7 (Seven) Days., Disp: , Rfl:          Drug Interactions  None with Leqvio    Goals of Therapy  LDL Reduction     Medication Assessment & Plan  Patient will continue Leqvio 284mg SC every 6 months.  Robyn Borden (PharmD candidate) administered Leqvio 284mg SUBQ in right side of abdomen.  First dose: 9/27/23  Second dose: 12/20/23  Third Dose: 8/16/24  Fourth dose: 2/14/25 (Lot [id number], Exp 5/31/26)  Medication education provided at initial appointment.  Most recent LDL was 40 on 12/2/24.  Patient will continue regular follow-up with cardiology  Will follow up in 6 months for next injection.     Monisha Eng, PharmD  2/14/2025  12:13 EST

## 2025-02-25 ENCOUNTER — OFFICE VISIT (OUTPATIENT)
Dept: CARDIOLOGY | Facility: CLINIC | Age: 65
End: 2025-02-25
Payer: MEDICARE

## 2025-02-25 VITALS
DIASTOLIC BLOOD PRESSURE: 92 MMHG | WEIGHT: 192 LBS | HEART RATE: 72 BPM | HEIGHT: 68 IN | BODY MASS INDEX: 29.1 KG/M2 | SYSTOLIC BLOOD PRESSURE: 150 MMHG

## 2025-02-25 DIAGNOSIS — E78.00 HYPERCHOLESTEREMIA: ICD-10-CM

## 2025-02-25 DIAGNOSIS — R06.02 SHORTNESS OF BREATH: ICD-10-CM

## 2025-02-25 DIAGNOSIS — I35.1 AORTIC VALVE INSUFFICIENCY, ETIOLOGY OF CARDIAC VALVE DISEASE UNSPECIFIED: ICD-10-CM

## 2025-02-25 DIAGNOSIS — I27.20 PHT (PULMONARY HYPERTENSION): ICD-10-CM

## 2025-02-25 DIAGNOSIS — I10 ESSENTIAL HYPERTENSION: Primary | ICD-10-CM

## 2025-02-25 PROCEDURE — 1160F RVW MEDS BY RX/DR IN RCRD: CPT | Performed by: INTERNAL MEDICINE

## 2025-02-25 PROCEDURE — 1159F MED LIST DOCD IN RCRD: CPT | Performed by: INTERNAL MEDICINE

## 2025-02-25 PROCEDURE — 3077F SYST BP >= 140 MM HG: CPT | Performed by: INTERNAL MEDICINE

## 2025-02-25 PROCEDURE — 3080F DIAST BP >= 90 MM HG: CPT | Performed by: INTERNAL MEDICINE

## 2025-02-25 PROCEDURE — 99214 OFFICE O/P EST MOD 30 MIN: CPT | Performed by: INTERNAL MEDICINE

## 2025-02-25 RX ORDER — CARVEDILOL 12.5 MG/1
12.5 TABLET ORAL 2 TIMES DAILY
Qty: 180 TABLET | Refills: 4 | Status: SHIPPED | OUTPATIENT
Start: 2025-02-25

## 2025-02-25 RX ORDER — PANTOPRAZOLE SODIUM 40 MG/1
40 TABLET, DELAYED RELEASE ORAL DAILY
COMMUNITY

## 2025-02-25 RX ORDER — LOSARTAN POTASSIUM 100 MG/1
100 TABLET ORAL DAILY
Qty: 90 TABLET | Refills: 3 | Status: SHIPPED | OUTPATIENT
Start: 2025-02-25

## 2025-02-25 RX ORDER — SUCRALFATE 1 G/1
1 TABLET ORAL 3 TIMES DAILY PRN
COMMUNITY

## 2025-02-25 RX ORDER — NIFEDIPINE 90 MG/1
90 TABLET, EXTENDED RELEASE ORAL DAILY
Qty: 90 TABLET | Refills: 4 | Status: SHIPPED | OUTPATIENT
Start: 2025-02-25

## 2025-02-25 NOTE — LETTER
February 25, 2025     Jaxson Hale MD  [address] [facility] [address]    Patient: Yessenia Baxter   YOB: 1960   Date of Visit: 2/25/2025     Dear Jaxson Hale MD:       Thank you for referring Yessenia Baxter to me for evaluation. Below are the relevant portions of my assessment and plan of care.    If you have questions, please do not hesitate to call me. I look forward to following Yessenia along with you.         Sincerely,        Ja Bojorquez MD        CC: No Recipients    Ja Bojorquez MD  02/25/25 1334  Sign when Signing Visit  Chief Complaint   Patient presents with   • Follow-up     Cardiac management   • LABS     CT scan- 12-2-2024 and LABS 2- , results are in door   • Palpitations     Patient reports she has flutters at times . And has SOA with climbing stairs  Has elevated BP today, patient said she has not taken Lasix today   • Med Refill     Requests refills on Coreg, lasix,losartan and Nifedipine 90 day supply to [facility]        CARDIAC COMPLAINTS  dyspnea and palpitations        Subjective  Yessenia Baxter is a 65 y.o. female came in today for her regular follow-up visit.  She has history of hypertension, hypercholesterolemia, valvular heart disease and also has connective tissue disorder.  She has been treated with Liqvio and so far has tolerated well.  Her lab work which was done in December showed total cholesterol of 172, triglycerides 318 and LDL of 40.  She has undergone the shoulder surgery without any problem.  Her main symptom is shortness of breath mostly on climbing stairs.  She also has been noticing increasing the blood pressure.  She has not taken the diuretic today.            Cardiac History  Past Surgical History:   Procedure Laterality Date   • CARDIOVASCULAR STRESS TEST  09/16/2015    R. Stress- ([facility]) 6 Min, 90% THR. BP- 204/100. Few PVC's. Negative    • CARDIOVASCULAR STRESS TEST  11/28/2016    @[facility]. 6 Min, 89%THR. BP- 210/106. Negative    • ECHO - CONVERTED  09/16/2015     Echo- ([facility]) EF 60%. Mod AI. RVSP- 50 mmHg    • ECHO - CONVERTED  11/28/2016    @[facility]. EF 65%. Mild- Mod AI. RVSP-51 mmHg   • ECHO - CONVERTED  11/21/2017    @[facility]. EF 65%. Mild- Mod AI. Mild MR. RVSP- 54 mmHg   • ECHO - CONVERTED  03/29/2023    EF 65%.Mild-Mod AI, Mild MR. RVSP- 41 mmHg       Current Outpatient Medications   Medication Sig Dispense Refill   • Acetaminophen (TYLENOL ARTHRITIS PAIN PO) Take 1,000 mg by mouth Every Night.     • BLACK COHOSH PO Take  by mouth 2 (Two) Times a Day.     • carvedilol (COREG) 12.5 MG tablet Take 1 tablet by mouth 2 (Two) Times a Day. 180 tablet 4   • Cyanocobalamin (VITAMIN B-12 IJ) Inject  as directed Every 14 (Fourteen) Days.     • furosemide (LASIX) 20 MG tablet Take 1 tablet by mouth As Needed (Leg edema). 30 tablet 11   • gabapentin (NEURONTIN) 100 MG capsule Take 1 capsule by mouth 3 (Three) Times a Day.     • Inclisiran Sodium (Leqvio) 284 MG/1.5ML solution prefilled syringe Inject 1.5 mL under the skin into the appropriate area as directed Every 6 (Six) Months.     • leflunomide (ARAVA) 10 MG tablet Take 1 tablet by mouth Daily.     • levothyroxine (SYNTHROID, LEVOTHROID) 112 MCG tablet Take 1 tablet by mouth Daily.     • lidocaine (LIDODERM) 5 % Place 1 patch on the skin as directed by provider Daily As Needed for Mild Pain. Remove & Discard patch within 12 hours or as directed by MD     • losartan (COZAAR) 100 MG tablet Take 1 tablet by mouth Daily. 90 tablet 3   • Multiple Vitamin (MULTI VITAMIN PO) Take  by mouth Daily.     • pantoprazole (PROTONIX) 40 MG EC tablet Take 1 tablet by mouth Daily.     • Probiotic Product (SOLUBLE FIBER/PROBIOTICS PO) Take 1 capsule by mouth Daily.     • sucralfate (CARAFATE) 1 g tablet Take 1 tablet by mouth 3 (Three) Times a Day As Needed.     • traMADol (ULTRAM) 50 MG tablet Take 1 tablet by mouth Every Night.     • traZODone (DESYREL) 50 MG tablet Take 0.25 tablets by mouth Every Night.     •  vitamin D (ERGOCALCIFEROL) 84801 UNITS capsule capsule Take 1 capsule by mouth Every 7 (Seven) Days.     • NIFEdipine XL (Procardia XL) 90 MG 24 hr tablet Take 1 tablet by mouth Daily. 90 tablet 4     No current facility-administered medications for this visit.       Allergies  :  Hydrochlorothiazide, Spironolactone, Aspirin, Zetia [ezetimibe], Demerol [meperidine], Latex, Other, Penicillins, Simvastatin, Tetracyclines & related, Crestor [rosuvastatin calcium], and Lipitor [atorvastatin]       Past Medical History:   Diagnosis Date   • Abnormal ECG 2014   • Arrhythmia 2014   • Arthritis    • Cyst removed from right hand    • Esophageal reflux    • Fibromyalgia    • H/O shoulder surgery     right   • H/O: hysterectomy    • Heart murmur 2014   • Heart valve disease 2014   • History of cholecystectomy    • Hypercholesterolemia    • Hypertension    • Hypothyroidism    • Left knee surgery twice    • Lupus     Followed rheumatologist   • RA (rheumatoid arthritis)     Bilateral Hip   • Sarcoidosis    • Sleep apnea     Does not wear CPAP       Social History     Socioeconomic History   • Marital status:    Tobacco Use   • Smoking status: Never     Passive exposure: Past   • Smokeless tobacco: Never   Vaping Use   • Vaping status: Never Used   Substance and Sexual Activity   • Alcohol use: No   • Drug use: No   • Sexual activity: Yes     Partners: Male     Birth control/protection: None       Family History   Problem Relation Age of Onset   • Stroke Mother 76   • Other Mother         History of A Fib.   • Anemia Mother    • Hypertension Mother    • Heart attack Father    • Heart failure Father    • Other Other          one at age 43 had AVR. one had stents at 67,    • Heart attack Maternal Grandfather         Massive heart attack   • Heart disease Brother         open heart surgery; valve replacement   • Hypertension Brother    • Heart disease Brother         Open heart surgery; MI   • Hyperlipidemia Brother    •  "Hypertension Brother    • Hypertension Sister        Review of Systems   Constitutional: Negative for decreased appetite and malaise/fatigue.   HENT:  Negative for congestion and sore throat.    Eyes:  Negative for blurred vision, double vision and visual disturbance.   Cardiovascular:  Positive for dyspnea on exertion and palpitations. Negative for chest pain.   Respiratory:  Positive for shortness of breath. Negative for snoring.    Endocrine: Negative for cold intolerance and heat intolerance.   Hematologic/Lymphatic: Negative for adenopathy. Does not bruise/bleed easily.   Skin:  Negative for itching, nail changes and skin cancer.   Musculoskeletal:  Negative for arthritis and myalgias.   Gastrointestinal:  Negative for abdominal pain, dysphagia and heartburn.   Genitourinary:  Negative for bladder incontinence and frequency.   Neurological:  Negative for dizziness, seizures and vertigo.   Psychiatric/Behavioral:  Negative for altered mental status.    Allergic/Immunologic: Negative for environmental allergies and hives.     Diabetes- No  Thyroid- abnormal    Objective    /92 (BP Location: Left arm, Patient Position: Sitting, Cuff Size: Adult)   Pulse 72   Ht 172.7 cm (67.99\")   Wt 87.1 kg (192 lb)   BMI 29.20 kg/m²     Vitals and nursing note reviewed.   Constitutional:       Appearance: Healthy appearance. Not in distress.   Eyes:      Conjunctiva/sclera: Conjunctivae normal.      Pupils: Pupils are equal, round, and reactive to light.   HENT:      Head: Normocephalic.   Pulmonary:      Effort: Pulmonary effort is normal.      Breath sounds: Normal breath sounds.   Cardiovascular:      PMI at left midclavicular line. Normal rate. Regular rhythm.      Murmurs: There is a grade 3/4 high frequency blowing decrescendo, early diastolic murmur at the URSB, radiating to the apex.   Abdominal:      General: Bowel sounds are normal.      Palpations: Abdomen is soft.   Musculoskeletal: Normal range of motion. "      Cervical back: Normal range of motion and neck supple. Skin:     General: Skin is warm and dry.   Neurological:      Mental Status: Alert, oriented to person, place, and time and oriented to person, place and time.   Procedures            @ASSESSMENT/PLAN@        Diagnoses and all orders for this visit:    1. Essential hypertension (Primary)  -     carvedilol (COREG) 12.5 MG tablet; Take 1 tablet by mouth 2 (Two) Times a Day.  Dispense: 180 tablet; Refill: 4  -     losartan (COZAAR) 100 MG tablet; Take 1 tablet by mouth Daily.  Dispense: 90 tablet; Refill: 3  -     NIFEdipine XL (Procardia XL) 90 MG 24 hr tablet; Take 1 tablet by mouth Daily.  Dispense: 90 tablet; Refill: 4  -     Comprehensive Metabolic Panel; Future    2. Aortic valve insufficiency, etiology of cardiac valve disease unspecified  -     Adult Transthoracic Echo Complete W/ Cont if Necessary Per Protocol; Future    3. Hypercholesteremia  -     Lipid Panel; Future    4. PHT (pulmonary hypertension)  -     CBC & Differential; Future    5. Shortness of breath  -     CBC & Differential; Future  -     proBNP; Future  -     D-dimer, Quantitative; Future    At baseline her heart rate is stable but the blood pressure is elevated.  Her clinical examination reveals a BMI still at 29.  Her cardiovascular examination is remarkable for early diastolic murmur at the aortic area.    Regarding her hypertension, she is already on Coreg, Cozaar and Procardia XL.  At this time since her blood pressure is still elevated and she is tolerating Procardia, I increased her dose to 90 mg.  She is advised to recheck the electrolytes and renal function    Her aortic insufficiency has been running mild to moderate in 2023.  I like to re evaluate the AI by repeating the echocardiogram.    Her cholesterol is being managed.  Will recheck the lipid panel along with LFT    She did have mild pulmonary hypertension, like to reevaluate the PA pressure by the echocardiogram    Her  shortness of breath is chronic.  Since it is little more after the surgery, I like to check her BNP level and D-dimer    Regarding advance directive, she does have a living will and power of     I will see her back in 6 months or sooner if needed                    Electronically signed by Ja Bojorquez MD February 25, 2025 13:28 EST

## 2025-03-12 ENCOUNTER — TELEPHONE (OUTPATIENT)
Dept: CARDIOLOGY | Facility: CLINIC | Age: 65
End: 2025-03-12
Payer: MEDICARE

## 2025-03-12 NOTE — TELEPHONE ENCOUNTER
Patient called stating that Dr. Lynne is wanting to start her on Cimzia for RA. She is asking if it safe to take? She states that she read that is can cause heart failure.

## 2025-03-13 NOTE — TELEPHONE ENCOUNTER
Yes.  It can cause heart failure but low percentage of people.  I have not used it.  She may need to talk to the rheumatologist about possible complication

## 2025-03-13 NOTE — TELEPHONE ENCOUNTER
Patient was made aware that it was a low percentage of people that had heart failure on Cimzia. She was made aware to talk to Dr. Lynne about if that is something they have seen in their practice or if they are concerned about that being a side effect.

## 2025-08-07 ENCOUNTER — SPECIALTY PHARMACY (OUTPATIENT)
Dept: CARDIOLOGY | Facility: HOSPITAL | Age: 65
End: 2025-08-07
Payer: MEDICARE

## 2025-08-14 ENCOUNTER — TELEPHONE (OUTPATIENT)
Dept: CARDIOLOGY | Facility: CLINIC | Age: 65
End: 2025-08-14
Payer: MEDICARE

## 2025-08-28 ENCOUNTER — TELEPHONE (OUTPATIENT)
Dept: CARDIOLOGY | Facility: CLINIC | Age: 65
End: 2025-08-28

## 2025-08-28 ENCOUNTER — HOSPITAL ENCOUNTER (OUTPATIENT)
Dept: CARDIOLOGY | Facility: HOSPITAL | Age: 65
Discharge: HOME | End: 2025-08-28
Admitting: INTERNAL MEDICINE
Payer: MEDICARE

## 2025-08-28 ENCOUNTER — OFFICE VISIT (OUTPATIENT)
Dept: CARDIOLOGY | Facility: CLINIC | Age: 65
End: 2025-08-28
Payer: MEDICARE

## 2025-08-28 VITALS — HEIGHT: 68 IN | WEIGHT: 192.02 LBS | BODY MASS INDEX: 29.1 KG/M2

## 2025-08-28 VITALS
DIASTOLIC BLOOD PRESSURE: 84 MMHG | BODY MASS INDEX: 30.16 KG/M2 | HEIGHT: 68 IN | SYSTOLIC BLOOD PRESSURE: 128 MMHG | WEIGHT: 199 LBS | HEART RATE: 76 BPM

## 2025-08-28 DIAGNOSIS — G47.30 SLEEP APNEA IN ADULT: ICD-10-CM

## 2025-08-28 DIAGNOSIS — I35.1 AORTIC VALVE INSUFFICIENCY, ETIOLOGY OF CARDIAC VALVE DISEASE UNSPECIFIED: ICD-10-CM

## 2025-08-28 DIAGNOSIS — D86.0 SARCOIDOSIS OF LUNG: ICD-10-CM

## 2025-08-28 DIAGNOSIS — E03.9 HYPOTHYROIDISM, UNSPECIFIED TYPE: ICD-10-CM

## 2025-08-28 DIAGNOSIS — I10 ESSENTIAL HYPERTENSION: Primary | ICD-10-CM

## 2025-08-28 DIAGNOSIS — R06.02 SHORTNESS OF BREATH: ICD-10-CM

## 2025-08-28 DIAGNOSIS — E78.00 HYPERCHOLESTEREMIA: ICD-10-CM

## 2025-08-28 DIAGNOSIS — R60.0 BILATERAL LEG EDEMA: ICD-10-CM

## 2025-08-28 LAB
AORTIC DIMENSIONLESS INDEX: 0.62 (DI)
AV MEAN PRESS GRAD SYS DOP V1V2: 3.4 MMHG
AV VMAX SYS DOP: 124.6 CM/SEC
BH CV ECHO MEAS - ACS: 1.59 CM
BH CV ECHO MEAS - AI P1/2T: 491 MSEC
BH CV ECHO MEAS - AO MAX PG: 6.2 MMHG
BH CV ECHO MEAS - AO ROOT DIAM: 3.2 CM
BH CV ECHO MEAS - AO V2 VTI: 29 CM
BH CV ECHO MEAS - EDV(CUBED): 140.9 ML
BH CV ECHO MEAS - EF(MOD-SP2): 66 %
BH CV ECHO MEAS - EF(MOD-SP4): 60 %
BH CV ECHO MEAS - ESV(CUBED): 50 ML
BH CV ECHO MEAS - FS: 29.2 %
BH CV ECHO MEAS - IVS/LVPW: 0.89 CM
BH CV ECHO MEAS - IVSD: 1.26 CM
BH CV ECHO MEAS - LA DIMENSION: 4.2 CM
BH CV ECHO MEAS - LAT PEAK E' VEL: 6.7 CM/SEC
BH CV ECHO MEAS - LV MASS(C)D: 291.1 GRAMS
BH CV ECHO MEAS - LV MAX PG: 2.14 MMHG
BH CV ECHO MEAS - LV MEAN PG: 0.94 MMHG
BH CV ECHO MEAS - LV V1 MAX: 73.2 CM/SEC
BH CV ECHO MEAS - LV V1 VTI: 18 CM
BH CV ECHO MEAS - LVIDD: 5.2 CM
BH CV ECHO MEAS - LVIDS: 3.7 CM
BH CV ECHO MEAS - LVPWD: 1.42 CM
BH CV ECHO MEAS - MED PEAK E' VEL: 8.4 CM/SEC
BH CV ECHO MEAS - MR MAX PG: 38.7 MMHG
BH CV ECHO MEAS - MR MAX VEL: 311.1 CM/SEC
BH CV ECHO MEAS - MV A MAX VEL: 77.5 CM/SEC
BH CV ECHO MEAS - MV DEC SLOPE: 318.4 CM/SEC2
BH CV ECHO MEAS - MV DEC TIME: 0.23 SEC
BH CV ECHO MEAS - MV E MAX VEL: 93.3 CM/SEC
BH CV ECHO MEAS - MV E/A: 1.2
BH CV ECHO MEAS - MV MAX PG: 3.3 MMHG
BH CV ECHO MEAS - MV MEAN PG: 1.3 MMHG
BH CV ECHO MEAS - MV P1/2T: 83.5 MSEC
BH CV ECHO MEAS - MV V2 VTI: 30.5 CM
BH CV ECHO MEAS - MVA(P1/2T): 2.6 CM2
BH CV ECHO MEAS - PA V2 MAX: 86 CM/SEC
BH CV ECHO MEAS - RAP SYSTOLE: 3 MMHG
BH CV ECHO MEAS - RV MAX PG: 1.52 MMHG
BH CV ECHO MEAS - RV V1 MAX: 61.6 CM/SEC
BH CV ECHO MEAS - RV V1 VTI: 15.9 CM
BH CV ECHO MEAS - RVDD: 4 CM
BH CV ECHO MEAS - RVSP: 7 MMHG
BH CV ECHO MEAS - TAPSE (>1.6): 3 CM
BH CV ECHO MEAS - TR MAX PG: 4 MMHG
BH CV ECHO MEAS - TR MAX VEL: 99.5 CM/SEC
BH CV ECHO MEASUREMENTS AVERAGE E/E' RATIO: 12.36
BH CV XLRA - TDI S': 13.2 CM/SEC
LV EF 2D ECHO EST: 56 %
LV EF BIPLANE MOD: 64 %

## 2025-08-28 PROCEDURE — 93306 TTE W/DOPPLER COMPLETE: CPT

## 2025-08-28 PROCEDURE — 3079F DIAST BP 80-89 MM HG: CPT | Performed by: INTERNAL MEDICINE

## 2025-08-28 PROCEDURE — 3074F SYST BP LT 130 MM HG: CPT | Performed by: INTERNAL MEDICINE

## 2025-08-28 PROCEDURE — 1159F MED LIST DOCD IN RCRD: CPT | Performed by: INTERNAL MEDICINE

## 2025-08-28 PROCEDURE — 99214 OFFICE O/P EST MOD 30 MIN: CPT | Performed by: INTERNAL MEDICINE

## 2025-08-28 PROCEDURE — 1160F RVW MEDS BY RX/DR IN RCRD: CPT | Performed by: INTERNAL MEDICINE

## 2025-08-28 RX ORDER — FUROSEMIDE 20 MG/1
20 TABLET ORAL DAILY PRN
Qty: 30 TABLET | Refills: 11 | Status: SHIPPED | OUTPATIENT
Start: 2025-08-28

## 2025-08-28 RX ORDER — PANTOPRAZOLE SODIUM 20 MG/1
20 TABLET, DELAYED RELEASE ORAL DAILY
COMMUNITY

## 2025-08-28 RX ORDER — LOSARTAN POTASSIUM 100 MG/1
100 TABLET ORAL DAILY
Qty: 90 TABLET | Refills: 3 | Status: SHIPPED | OUTPATIENT
Start: 2025-08-28

## 2025-08-28 RX ORDER — FUROSEMIDE 20 MG/1
20 TABLET ORAL AS NEEDED
Qty: 30 TABLET | Refills: 11 | Status: SHIPPED | OUTPATIENT
Start: 2025-08-28 | End: 2025-08-28 | Stop reason: SDUPTHER

## 2025-08-28 RX ORDER — CARVEDILOL 12.5 MG/1
12.5 TABLET ORAL 2 TIMES DAILY
Qty: 180 TABLET | Refills: 4 | Status: SHIPPED | OUTPATIENT
Start: 2025-08-28

## 2025-08-28 RX ORDER — NIFEDIPINE 90 MG/1
90 TABLET, EXTENDED RELEASE ORAL DAILY
Qty: 90 TABLET | Refills: 4 | Status: SHIPPED | OUTPATIENT
Start: 2025-08-28

## 2025-08-28 RX ORDER — CELECOXIB 200 MG/1
200 CAPSULE ORAL DAILY
COMMUNITY